# Patient Record
Sex: FEMALE | Race: WHITE | NOT HISPANIC OR LATINO
[De-identification: names, ages, dates, MRNs, and addresses within clinical notes are randomized per-mention and may not be internally consistent; named-entity substitution may affect disease eponyms.]

---

## 2021-03-02 ENCOUNTER — NON-APPOINTMENT (OUTPATIENT)
Age: 76
End: 2021-03-02

## 2021-03-02 ENCOUNTER — APPOINTMENT (OUTPATIENT)
Dept: OPHTHALMOLOGY | Facility: CLINIC | Age: 76
End: 2021-03-02

## 2021-05-25 ENCOUNTER — NON-APPOINTMENT (OUTPATIENT)
Age: 76
End: 2021-05-25

## 2021-05-25 ENCOUNTER — APPOINTMENT (OUTPATIENT)
Dept: OPHTHALMOLOGY | Facility: CLINIC | Age: 76
End: 2021-05-25

## 2021-07-20 ENCOUNTER — NON-APPOINTMENT (OUTPATIENT)
Age: 76
End: 2021-07-20

## 2021-07-21 ENCOUNTER — APPOINTMENT (OUTPATIENT)
Dept: OPHTHALMOLOGY | Facility: CLINIC | Age: 76
End: 2021-07-21

## 2022-10-03 ENCOUNTER — EMERGENCY (EMERGENCY)
Facility: HOSPITAL | Age: 77
LOS: 1 days | Discharge: HOME | End: 2022-10-03
Admitting: EMERGENCY MEDICINE
Payer: MEDICARE

## 2022-10-03 VITALS
SYSTOLIC BLOOD PRESSURE: 177 MMHG | OXYGEN SATURATION: 98 % | WEIGHT: 145.06 LBS | HEART RATE: 69 BPM | RESPIRATION RATE: 18 BRPM | TEMPERATURE: 98 F | HEIGHT: 63 IN | DIASTOLIC BLOOD PRESSURE: 85 MMHG

## 2022-10-03 VITALS
RESPIRATION RATE: 16 BRPM | SYSTOLIC BLOOD PRESSURE: 155 MMHG | DIASTOLIC BLOOD PRESSURE: 82 MMHG | OXYGEN SATURATION: 99 % | HEART RATE: 60 BPM

## 2022-10-03 PROCEDURE — 99283 EMERGENCY DEPT VISIT LOW MDM: CPT | Mod: 25

## 2022-10-03 PROCEDURE — 73562 X-RAY EXAM OF KNEE 3: CPT

## 2022-10-03 PROCEDURE — 73562 X-RAY EXAM OF KNEE 3: CPT | Mod: 26

## 2022-10-03 PROCEDURE — 73562 X-RAY EXAM OF KNEE 3: CPT | Mod: 26,RT

## 2022-10-03 NOTE — ED PROVIDER NOTE - CARE PROVIDER_API CALL
Anthony Cortez)  Orthopaedic Surgery  [address] [place] [address]  Phone: [phone]  Fax: [phone]  Follow Up Time:

## 2022-10-03 NOTE — ED PROVIDER NOTE - NSFOLLOWUPINSTRUCTIONS_ED_ALL_ED_FT
Acute Knee Pain, Adult      Many things can cause knee pain. Sometimes, knee pain is sudden (acute) and may be caused by damage, swelling, or irritation of the muscles and tissues that support your knee.    The pain often goes away on its own with time and rest. If the pain does not go away, tests may be done to find out what is causing the pain.      Follow these instructions at home:      If you have a knee sleeve or brace:      •Wear the knee sleeve or brace as told by your doctor. Take it off only as told by your doctor.    •Loosen it if your toes:  •Tingle.      •Become numb.      •Turn cold and blue.        •Keep it clean.    •If the knee sleeve or brace is not waterproof:  •Do not let it get wet.      •Cover it with a watertight covering when you take a bath or shower.        Activity     •Rest your knee.      • Do not do things that cause pain or make pain worse.      •Avoid activities where both feet leave the ground at the same time (high-impact activities). Examples are running, jumping rope, and doing jumping jacks.      •Work with a physical therapist to make a safe exercise program, as told by your doctor.        Managing pain, stiffness, and swelling    •If told, put ice on the knee. To do this:  •If you have a removable knee sleeve or brace, take it off as told by your doctor.      •Put ice in a plastic bag.      •Place a towel between your skin and the bag.      •Leave the ice on for 20 minutes, 2–3 times a day.      •Take off the ice if your skin turns bright red. This is very important. If you cannot feel pain, heat, or cold, you have a greater risk of damage to the area.        •If told, use an elastic bandage to put pressure (compression) on your injured knee.      •Raise your knee above the level of your heart while you are sitting or lying down.      •Sleep with a pillow under your knee.      General instructions     •Take over-the-counter and prescription medicines only as told by your doctor.      • Do not smoke or use any products that contain nicotine or tobacco. If you need help quitting, ask your doctor.      •If you are overweight, work with your doctor and a food expert (dietitian) to set goals to lose weight. Being overweight can make your knee hurt more.      •Watch for any changes in your symptoms.      •Keep all follow-up visits.        Contact a doctor if:    •The knee pain does not stop.      •The knee pain changes or gets worse.      •You have a fever along with knee pain.      •Your knee is red or feels warm when you touch it.      •Your knee gives out or locks up.        Get help right away if:    •Your knee swells, and the swelling gets worse.      •You cannot move your knee.      •You have very bad knee pain that does not get better with pain medicine.        Summary    •Many things can cause knee pain. The pain often goes away on its own with time and rest.      •Your doctor may do tests to find out the cause of the pain.      •Watch for any changes in your symptoms. Relieve your pain with rest, medicines, light activity, and use of ice.      •Get help right away if you cannot move your knee or your knee pain is very bad.      This information is not intended to replace advice given to you by your health care provider. Make sure you discuss any questions you have with your health care provider.

## 2022-10-03 NOTE — ED ADULT NURSE NOTE - OBJECTIVE STATEMENT
Pt presents to ED C/O R knee pain and swelling with painful ambulation. Pt reports symptoms coming on suddenly. Ambulates independently at baseline. Walking with cane upon arrival to [facility]. Denies other symptoms/ pain. Denies recent injury. Seen and eval by PA, knee wrapped with ACE by PA.

## 2022-10-03 NOTE — ED PROVIDER NOTE - PATIENT PORTAL LINK FT
You can access the FollowMyHealth Patient Portal offered by [facility] by registering at the following website: [email or web address]. By joining [employer]’s FollowMyHealth portal, you will also be able to view your health information using other applications (apps) compatible with our system.

## 2022-10-03 NOTE — ED PROVIDER NOTE - MUSCULOSKELETAL, MLM
right knee no swelling, no deformity, no bony tenderness, +FROM, strength 5/5, sensation intact distally, DP/PT 2+

## 2022-10-03 NOTE — ED ADULT TRIAGE NOTE - OTHER COMPLAINTS
patient c/o L knee pain and swelling x Saturday, states "it gave out on Saturday"-- arrives ambulatory with cane--denies fall/head strike

## 2022-10-03 NOTE — ED PROVIDER NOTE - CLINICAL SUMMARY MEDICAL DECISION MAKING FREE TEXT BOX
76 y/o f presents c/o atraumatic right knee pain x 1 day; ext NVI, pt ambulatory with a cane, declined pain meds.  XR shows hardware from previous surgery, no acute findings.  ACE wrap given, recommend elevate, tylenol PRN pain, f/u ortho

## 2022-10-03 NOTE — ED PROVIDER NOTE - OBJECTIVE STATEMENT
76 y/o f presents c/o knee pain which started yesterday as she stood up from a sitting position.  Pt reports pain to right knee, felt as though it "gave out" yesterday.  Pt hasn't been taking anything for pain, ambulating using a cane she borrowed.  Denies numbness/tingling to ext, weakness, all other ROS negative.

## 2022-10-06 ENCOUNTER — NON-APPOINTMENT (OUTPATIENT)
Age: 77
End: 2022-10-06

## 2022-10-06 DIAGNOSIS — M25.561 PAIN IN RIGHT KNEE: ICD-10-CM
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
## 2022-10-06 DIAGNOSIS — Z88.8 ALLERGY STATUS TO OTHER DRUGS, MEDICAMENTS AND BIOLOGICAL SUBSTANCES: ICD-10-CM

## 2022-10-06 DIAGNOSIS — Z91.018 ALLERGY TO OTHER FOODS: ICD-10-CM

## 2022-10-06 DIAGNOSIS — Z88.0 ALLERGY STATUS TO PENICILLIN: ICD-10-CM
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
## 2022-10-06 DIAGNOSIS — Z88.1 ALLERGY STATUS TO OTHER ANTIBIOTIC AGENTS STATUS: ICD-10-CM
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
## 2022-10-10 ENCOUNTER — APPOINTMENT (OUTPATIENT)
Dept: ORTHOPEDIC SURGERY | Facility: CLINIC | Age: 77
End: 2022-10-10

## 2022-10-17 ENCOUNTER — TRANSCRIPTION ENCOUNTER (OUTPATIENT)
Age: 77
End: 2022-10-17

## 2022-10-21 ENCOUNTER — APPOINTMENT (OUTPATIENT)
Dept: ORTHOPEDIC SURGERY | Facility: CLINIC | Age: 77
End: 2022-10-21

## 2022-10-21 VITALS — DIASTOLIC BLOOD PRESSURE: 76 MMHG | HEART RATE: 66 BPM | OXYGEN SATURATION: 99 % | SYSTOLIC BLOOD PRESSURE: 138 MMHG

## 2022-10-21 VITALS — BODY MASS INDEX: 25.69 KG/M2 | HEIGHT: 63 IN | WEIGHT: 145 LBS

## 2022-10-21 DIAGNOSIS — Z87.39 PERSONAL HISTORY OF OTHER DISEASES OF THE MUSCULOSKELETAL SYSTEM AND CONNECTIVE TISSUE: ICD-10-CM

## 2022-10-21 DIAGNOSIS — Z86.79 PERSONAL HISTORY OF OTHER DISEASES OF THE CIRCULATORY SYSTEM: ICD-10-CM

## 2022-10-21 PROCEDURE — 99203 OFFICE O/P NEW LOW 30 MIN: CPT

## 2022-10-23 PROBLEM — Z86.79 HISTORY OF HYPERTENSION: Status: RESOLVED | Noted: 2022-10-21 | Resolved: 2022-10-23

## 2022-10-23 PROBLEM — Z87.39 HISTORY OF ARTHRITIS: Status: RESOLVED | Noted: 2022-10-21 | Resolved: 2022-10-23

## 2022-10-23 RX ORDER — DENOSUMAB 60 MG/ML
60 INJECTION SUBCUTANEOUS
Qty: 1 | Refills: 0 | Status: ACTIVE | COMMUNITY
Start: 2022-06-20

## 2022-10-23 NOTE — HISTORY OF PRESENT ILLNESS
[0] : a current pain level of 0/10 [Intermit.] : ~He/She~ states the symptoms seem to be intermittent [None] : No relieving factors are noted [de-identified] : 10/21/22: 75 y/o female presents for right knee pain for about 1 month. Patient states she got up from the couch walked to the bathroom and felt her right knee give out. She denies any fall or precipitating events. She went to the ER and was provided ACE compression. She admits being opposed to pain medication and has not used anything for pain relief. Symptoms have spontaneously recovered since then. Describes her pain as a persisting dull sensations and rates it 0/10. \par \par PSH: Right patella fracture repair 25 years ago. She reports no residual pain following surgery. [de-identified] : patient states pain is dull.

## 2022-10-23 NOTE — DISCUSSION/SUMMARY
[de-identified] : 78 y/o female with essentially resolved right knee complaints secondary to what I most likely think to be a resolved pseudo-gout flare. \par - At this time she does not require any invasive treatment and she is not interested in any oral medications. \par - I wrote her for PT and home exercise program. \par - She will f/u for any recurrence of symptoms, especially if there is any associated swelling, to perform a joint aspiration.

## 2022-10-23 NOTE — ADDENDUM
[FreeTextEntry1] : Documented by Jessica Whitman acting as a scribe for Dr. Abhishek Lynn on 10/21/2022.

## 2022-10-23 NOTE — END OF VISIT
[FreeTextEntry3] : All medical record entries made by the Scribe were at my, Dr. Abhishek Lynn, direction and personally dictated by me on 10/21/2022. I have reviewed the chart and agree that the record accurately reflects my personal performance of the history, physical exam, assessment and plan. I have also personally directed, reviewed, and agreed with the chart.

## 2022-10-23 NOTE — PHYSICAL EXAM
[de-identified] : General appearance: well nourished and hydrated, pleasant, alert and oriented x 3, cooperative.  \par HEENT: normocephalic, EOM intact, wearing mask, external auditory canal clear.  \par Cardiovascular: no lower leg edema, scattered varicosities across bilateral lower extremities, dorsalis pedis pulses palpable and symmetric.  \par Lymphatics: no palpable lymphadenopathy, no lymphedema.  \par Neurologic: sensation is normal, no muscle weakness in upper or lower extremities, patella tendon reflexes present and symmetric.  \par Dermatologic: skin moist, warm, no rash.  \par Spine: cervical spine with normal lordosis and painless range of motion, thoracic spine with normal kyphosis and painless range of motion, lumbosacral spine with normal lordosis and painless range of motion.  No tenderness to palpation along midline spine and paraspinal musculature.  Sacroiliac joints nontender bilaterally. Negative SLR and crossed SLR tests bilaterally.\par Gait: she is ambulating without an assistive device and without antalgia\par \par Left knee: \par - Focal soft tissue swelling: none\par - Ecchymosis: none\par - Erythema: none\par - Effusion: none\par - Wounds: none\par - Alignment: normal\par - Tenderness: none\par - ROM: 0-135\par - Collateral laxity: none\par - Cruciate laxity: none\par - Meniscal signs: negative Vladislav and Lakisha\par - Popliteal angle (degrees): 30\par - Quad strength: 5/5\par \par Right knee:\par - Focal soft tissue swelling: none\par - Ecchymosis: none\par - Erythema: none\par - Effusion: none\par - Wounds: well healed benign appearing anterior incision\par - Alignment: normal\par - Tenderness: none\par - ROM: 3-130\par - Collateral laxity: none\par - Cruciate laxity: none\par - Meniscal signs: significant prepatellar crepitus with Vladislav and Lakisha but no pain or clicking\par - Popliteal angle (degrees): 35\par - Quad strength: 5/5\par  [de-identified] : Imaging reviewed today is AP, lateral, and Merchant views taken 10/3/2022 of the right knee at [facility]. These demonstrate evidence of prior patella fracture with screw fixation with 3 retained screws in the patella. No residual fracture line is visible, nor any malunion of the patella. There is evidence of mild tricompartmental OA, KL 2. There is also medial and lateral compartment calcinosis. Patella sits at normal height and tracks centrally.

## 2022-11-22 ENCOUNTER — NON-APPOINTMENT (OUTPATIENT)
Age: 77
End: 2022-11-22

## 2022-11-22 ENCOUNTER — APPOINTMENT (OUTPATIENT)
Dept: OPHTHALMOLOGY | Facility: CLINIC | Age: 77
End: 2022-11-22

## 2023-06-14 ENCOUNTER — APPOINTMENT (OUTPATIENT)
Dept: INTERNAL MEDICINE | Facility: CLINIC | Age: 78
End: 2023-06-14

## 2023-06-15 ENCOUNTER — OUTPATIENT (OUTPATIENT)
Dept: OUTPATIENT SERVICES | Facility: HOSPITAL | Age: 78
LOS: 1 days | End: 2023-06-15
Payer: MEDICARE

## 2023-06-15 ENCOUNTER — APPOINTMENT (OUTPATIENT)
Dept: ULTRASOUND IMAGING | Facility: HOSPITAL | Age: 78
End: 2023-06-15

## 2023-06-15 PROCEDURE — 93880 EXTRACRANIAL BILAT STUDY: CPT

## 2023-06-15 PROCEDURE — 93880 EXTRACRANIAL BILAT STUDY: CPT | Mod: 26

## 2023-07-26 ENCOUNTER — APPOINTMENT (OUTPATIENT)
Dept: OPHTHALMOLOGY | Facility: CLINIC | Age: 78
End: 2023-07-26

## 2023-07-26 ENCOUNTER — APPOINTMENT (OUTPATIENT)
Dept: NEUROLOGY | Facility: CLINIC | Age: 78
End: 2023-07-26

## 2023-08-11 ENCOUNTER — APPOINTMENT (OUTPATIENT)
Dept: ORTHOPEDIC SURGERY | Facility: CLINIC | Age: 78
End: 2023-08-11

## 2023-08-25 ENCOUNTER — APPOINTMENT (OUTPATIENT)
Dept: NEUROLOGY | Facility: CLINIC | Age: 78
End: 2023-08-25
Payer: MEDICARE

## 2023-08-25 VITALS
BODY MASS INDEX: 25.69 KG/M2 | DIASTOLIC BLOOD PRESSURE: 68 MMHG | HEIGHT: 63 IN | HEART RATE: 98 BPM | TEMPERATURE: 97.6 F | WEIGHT: 145 LBS | RESPIRATION RATE: 17 BRPM | OXYGEN SATURATION: 97 % | SYSTOLIC BLOOD PRESSURE: 130 MMHG

## 2023-08-25 DIAGNOSIS — R55 SYNCOPE AND COLLAPSE: ICD-10-CM

## 2023-08-25 PROCEDURE — 99204 OFFICE O/P NEW MOD 45 MIN: CPT

## 2023-08-25 NOTE — CONSULT LETTER
[Dear  ___] : Dear  [unfilled], [Courtesy Letter:] : I had the pleasure of seeing your patient, [unfilled], in my office today. [Please see my note below.] : Please see my note below. [Consult Closing:] : Thank you very much for allowing me to participate in the care of this patient.  If you have any questions, please do not hesitate to contact me. [Sincerely,] : Sincerely, [DrPerla  ___] : Dr. COVINGTON [FreeTextEntry3] : Jorge A Abraham MD

## 2023-08-25 NOTE — PHYSICAL EXAM
[FreeTextEntry1] : General: this is a pleasant patient in no acute distress  HEENT conjunctiva are normal, no tenderness in head  CV: normal pulses, regular rate and rhythm, no peripheral edema noted  Lungs: breathing is non-labored  abd: soft and non-distended  MSK: arthritic joints noted SLR:  RUTH: range of motion: mild restricted tinnels:  spurling: Occipital nerve tenderness:  Mental status: Alert and oriented to person, place and time, normal speech and comprehension  Cranial Nerves: extra-occular movements in tact without nystagmus, normal saccades and smooth pursuit, Face symmetric and facial strength symmetric, facial sensation symmetric, left hearing to rub, right to SNAP  Motor: normal bulk and tone throughout. no abnormal movements.  Full 5/5 strength uppers and lower extremities proximally and distally  Sensory: in tact and symmetric to vibration, light tough, temperature  Cerebellar: normal finger-nose-finger bilaterally  Reflexes: 2+ in the upper and lower extremities and symmetric.  toes are bilaterally downgoing.  Gait: stable, able to tip toe heel and mild difficulty tandem  Stances: Romberg: normal

## 2023-08-25 NOTE — HISTORY OF PRESENT ILLNESS
[FreeTextEntry1] : VALERIANO CONNORS is a 78 year who presents with visual phenomenon  since childhood, episodes of visual obscuration making her need to stop and wait for it to finish. typically develops over 5 minutes to the point of needing to rest. then it typically resolves within 30 minutes.   last year had blacking out of the eyes that passed on its own. optho said it was migraine aura.  started throughout whole vision total blackness and she sat down and closed her eyes.  she felt upset and a bit sick. she was able to open eyes and see within 15 minutes. there was really no positive visual phenomena.  this only happened twice and hasn't recurrent.  doesn't really remember what was going on those days but doesn't remember anything being unusual.   was present for the events and told her to sit down and calm down.  she didn't lie down for either.   2.5 years ago her  told her that she fainted after a hot shower. she was unaware this had happened.   Denies diplopia, blurred vision, dysphagia, dysarthria, aphasia, focal weakness or numbness, bowel or bladder dysfunction, falls, headaches. chronically a bit clumsy but no major change.  no change in sense of smell, mood, personality over the years.    she had MRI at [facility] radiology

## 2023-10-25 ENCOUNTER — APPOINTMENT (OUTPATIENT)
Dept: OPHTHALMOLOGY | Facility: CLINIC | Age: 78
End: 2023-10-25

## 2023-10-25 ENCOUNTER — NON-APPOINTMENT (OUTPATIENT)
Age: 78
End: 2023-10-25

## 2023-10-27 ENCOUNTER — NON-APPOINTMENT (OUTPATIENT)
Age: 78
End: 2023-10-27

## 2023-11-14 ENCOUNTER — APPOINTMENT (OUTPATIENT)
Dept: INTERNAL MEDICINE | Facility: CLINIC | Age: 78
End: 2023-11-14
Payer: MEDICARE

## 2023-11-14 VITALS
SYSTOLIC BLOOD PRESSURE: 133 MMHG | BODY MASS INDEX: 25.41 KG/M2 | OXYGEN SATURATION: 98 % | WEIGHT: 143.38 LBS | TEMPERATURE: 98.2 F | DIASTOLIC BLOOD PRESSURE: 69 MMHG | HEART RATE: 68 BPM | HEIGHT: 63 IN

## 2023-11-14 PROCEDURE — 99214 OFFICE O/P EST MOD 30 MIN: CPT

## 2023-11-14 PROCEDURE — G0439: CPT

## 2023-11-14 RX ORDER — ADHESIVE TAPE 3"X 2.3 YD
50 MCG TAPE, NON-MEDICATED TOPICAL
Refills: 0 | Status: ACTIVE | COMMUNITY

## 2023-11-14 RX ORDER — DENOSUMAB 60 MG/ML
60 INJECTION SUBCUTANEOUS
Refills: 0 | Status: ACTIVE | COMMUNITY

## 2023-11-15 ENCOUNTER — APPOINTMENT (OUTPATIENT)
Dept: INTERNAL MEDICINE | Facility: CLINIC | Age: 78
End: 2023-11-15
Payer: MEDICARE

## 2023-11-15 PROCEDURE — 36415 COLL VENOUS BLD VENIPUNCTURE: CPT

## 2023-11-16 LAB
25(OH)D3 SERPL-MCNC: 42.9 NG/ML
ALBUMIN SERPL ELPH-MCNC: 4.5 G/DL
ALP BLD-CCNC: 68 U/L
ALT SERPL-CCNC: 14 U/L
ANION GAP SERPL CALC-SCNC: 13 MMOL/L
AST SERPL-CCNC: 20 U/L
BILIRUB SERPL-MCNC: 0.6 MG/DL
BUN SERPL-MCNC: 21 MG/DL
CALCIUM SERPL-MCNC: 9.7 MG/DL
CHLORIDE SERPL-SCNC: 104 MMOL/L
CHOLEST SERPL-MCNC: 185 MG/DL
CO2 SERPL-SCNC: 28 MMOL/L
CREAT SERPL-MCNC: 1.1 MG/DL
EGFR: 52 ML/MIN/1.73M2
ESTIMATED AVERAGE GLUCOSE: 114 MG/DL
GLUCOSE SERPL-MCNC: 83 MG/DL
HBA1C MFR BLD HPLC: 5.6 %
HCT VFR BLD CALC: 41.7 %
HDLC SERPL-MCNC: 66 MG/DL
HGB BLD-MCNC: 13.4 G/DL
LDLC SERPL CALC-MCNC: 92 MG/DL
MCHC RBC-ENTMCNC: 31.8 PG
MCHC RBC-ENTMCNC: 32.1 GM/DL
MCV RBC AUTO: 99 FL
NONHDLC SERPL-MCNC: 119 MG/DL
PLATELET # BLD AUTO: 176 K/UL
POTASSIUM SERPL-SCNC: 4.1 MMOL/L
PROT SERPL-MCNC: 6.9 G/DL
RBC # BLD: 4.21 M/UL
RBC # FLD: 13.2 %
SODIUM SERPL-SCNC: 144 MMOL/L
T4 FREE SERPL-MCNC: 1.4 NG/DL
TRIGL SERPL-MCNC: 163 MG/DL
TSH SERPL-ACNC: 2.02 UIU/ML
WBC # FLD AUTO: 5.61 K/UL

## 2023-11-27 ENCOUNTER — APPOINTMENT (OUTPATIENT)
Dept: INTERNAL MEDICINE | Facility: CLINIC | Age: 78
End: 2023-11-27
Payer: MEDICARE

## 2023-11-27 VITALS
SYSTOLIC BLOOD PRESSURE: 134 MMHG | BODY MASS INDEX: 25.34 KG/M2 | TEMPERATURE: 98.1 F | WEIGHT: 143 LBS | HEART RATE: 54 BPM | DIASTOLIC BLOOD PRESSURE: 82 MMHG | HEIGHT: 63 IN | OXYGEN SATURATION: 99 %

## 2023-11-27 DIAGNOSIS — M11.261 OTHER CHONDROCALCINOSIS, RIGHT KNEE: ICD-10-CM

## 2023-11-27 DIAGNOSIS — G43.109 MIGRAINE WITH AURA, NOT INTRACTABLE, W/OUT STATUS MIGRAINOSUS: ICD-10-CM

## 2023-11-27 PROCEDURE — 99213 OFFICE O/P EST LOW 20 MIN: CPT

## 2023-12-11 ENCOUNTER — APPOINTMENT (OUTPATIENT)
Dept: HEART AND VASCULAR | Facility: CLINIC | Age: 78
End: 2023-12-11
Payer: MEDICARE

## 2023-12-11 ENCOUNTER — NON-APPOINTMENT (OUTPATIENT)
Age: 78
End: 2023-12-11

## 2023-12-11 VITALS
HEART RATE: 56 BPM | OXYGEN SATURATION: 97 % | DIASTOLIC BLOOD PRESSURE: 67 MMHG | BODY MASS INDEX: 25.69 KG/M2 | SYSTOLIC BLOOD PRESSURE: 120 MMHG | WEIGHT: 145 LBS | HEIGHT: 63 IN

## 2023-12-11 PROCEDURE — 93000 ELECTROCARDIOGRAM COMPLETE: CPT

## 2023-12-11 PROCEDURE — 99204 OFFICE O/P NEW MOD 45 MIN: CPT | Mod: 25

## 2023-12-11 RX ORDER — ASCORBIC ACID 125 MG
TABLET,CHEWABLE ORAL
Refills: 0 | Status: ACTIVE | COMMUNITY

## 2023-12-11 RX ORDER — ROSUVASTATIN CALCIUM 10 MG/1
10 TABLET, FILM COATED ORAL
Qty: 90 | Refills: 3 | Status: ACTIVE | COMMUNITY
Start: 2023-12-11 | End: 1900-01-01

## 2023-12-14 NOTE — ASSESSMENT
[FreeTextEntry1] : 78 year old woman h/o HTN, CKD, HLD, arthritis, right knee fracture s/p surgical repair, migraine with aura presents to establish care with lightheadedness and remote episode of syncope, which is likely vasovagal however no prior work-up, sinus lizabeth with rate variation, will obtain event monitor.  - ASCVD 10 year risk is intermediate 17.9%. Discussed benefits and side effects of statin, patient would like to start.  - Start Crestor 10 mg daily - TTE and 7 day event monitor - BP at goal, continue atenolol qd, amlodipine, and losartan-HCTZ for now

## 2023-12-14 NOTE — PHYSICAL EXAM
[No Acute Distress] : no acute distress [Normal Venous Pressure] : normal venous pressure [No Carotid Bruit] : no carotid bruit [Normal S1, S2] : normal S1, S2 [No Murmur] : no murmur [No Rub] : no rub [No Gallop] : no gallop [Soft] : abdomen soft [Non Tender] : non-tender [No Edema] : no edema

## 2023-12-14 NOTE — REASON FOR VISIT
[FreeTextEntry1] : 78 year old woman h/o HTN, CKD, HLD, arthritis, right knee fracture s/p surgical repair, migraine with aura presents to establish care. She does not do exercises due to knee pain. Was on NSAIDS however was instructed not take by PCP due to CKD. Denies chest pain or dyspnea with exertion. Walks [facility] [address] to [place], no symptoms.  Three ago, had syncopal episode after coming out of hot shower, then found herself on floor by . A few minutes of LOC patient believes. No prodrome, no vision changes before. No incontinence, no tongue biting. Was seen by PCP prior. Since as teenager, occasionally has had episodes of visual aura, blurriness and darkness at periphery of vision and one episode of "black out" vision with no loss of consciousness. No diaphoresis, no dyspnea or chest pain, no lightheadedness associated. No clear triggers.   Saw Cardiologist at [facility] however would like to switch care. Was previously only taking atenolol 50 BID for HTN, then losartan-HCTZ 50-12.5 and amlodipine 5 mg was added 1 month ago.   SH Former smoker, 1 pack year quit 56 years ago No EtOH Has 3 children, C-sections, uncomplicated  disabled  FH Sister  71 years old MI and CVA Brother MI at mid to late 50s

## 2023-12-15 ENCOUNTER — APPOINTMENT (OUTPATIENT)
Dept: OPHTHALMOLOGY | Facility: CLINIC | Age: 78
End: 2023-12-15

## 2023-12-20 LAB
APPEARANCE: CLEAR
BACTERIA: NEGATIVE /HPF
BILIRUBIN URINE: NEGATIVE
BLOOD URINE: NEGATIVE
CAST: 3 /LPF
COLOR: YELLOW
EPITHELIAL CELLS: 2 /HPF
GLUCOSE QUALITATIVE U: NEGATIVE MG/DL
KETONES URINE: NEGATIVE MG/DL
LEUKOCYTE ESTERASE URINE: ABNORMAL
MICROSCOPIC-UA: NORMAL
NITRITE URINE: NEGATIVE
PH URINE: 7.5
PROTEIN URINE: NORMAL MG/DL
RED BLOOD CELLS URINE: 3 /HPF
SPECIFIC GRAVITY URINE: 1.02
UROBILINOGEN URINE: 0.2 MG/DL
WHITE BLOOD CELLS URINE: 3 /HPF

## 2024-01-03 ENCOUNTER — APPOINTMENT (OUTPATIENT)
Dept: DERMATOLOGY | Facility: CLINIC | Age: 79
End: 2024-01-03
Payer: MEDICARE

## 2024-01-03 DIAGNOSIS — L30.9 DERMATITIS, UNSPECIFIED: ICD-10-CM

## 2024-01-03 DIAGNOSIS — D48.9 NEOPLASM OF UNCERTAIN BEHAVIOR, UNSPECIFIED: ICD-10-CM

## 2024-01-03 DIAGNOSIS — Z87.891 PERSONAL HISTORY OF NICOTINE DEPENDENCE: ICD-10-CM

## 2024-01-03 DIAGNOSIS — D18.01 HEMANGIOMA OF SKIN AND SUBCUTANEOUS TISSUE: ICD-10-CM

## 2024-01-03 DIAGNOSIS — Z12.83 ENCOUNTER FOR SCREENING FOR MALIGNANT NEOPLASM OF SKIN: ICD-10-CM

## 2024-01-03 DIAGNOSIS — B35.3 TINEA PEDIS: ICD-10-CM

## 2024-01-03 PROCEDURE — 11102 TANGNTL BX SKIN SINGLE LES: CPT

## 2024-01-03 PROCEDURE — 17000 DESTRUCT PREMALG LESION: CPT | Mod: 59

## 2024-01-03 PROCEDURE — 99204 OFFICE O/P NEW MOD 45 MIN: CPT | Mod: 25

## 2024-01-03 RX ORDER — KETOCONAZOLE 20 MG/G
2 CREAM TOPICAL
Qty: 60 | Refills: 3 | Status: ACTIVE | COMMUNITY
Start: 2024-01-03 | End: 1900-01-01

## 2024-01-03 RX ORDER — MAGNESIUM OXIDE 400 MG/1
400 TABLET ORAL
Refills: 0 | Status: ACTIVE | COMMUNITY

## 2024-01-03 NOTE — HISTORY OF PRESENT ILLNESS
[FreeTextEntry1] : NPV- skin check  [de-identified] : Suly Harrison is a 79 y/o F patient presenting to the clinic for a full body check. No areas of concern.  Patient reports always having dry and sensitive skin. Skin itches and uses Eucerin cream, neutrogena Norwegian hand cream, and aquaphor at night that helps with the itching. She describes herself as "OCD" and frequently washes her hands during the day. Used to golf. Is sun protective now.

## 2024-01-03 NOTE — ASSESSMENT
[FreeTextEntry1] : #Skin cancer screening  Sun protection reviewed. The patient was educated regarding appropriate sun protection measures, including wearing sunscreen with SPF 30 or higher when outdoors, sun protective clothing, and sun avoidance.  # Cherry angioma # Lentigines Patient was reassured of the benign nature of these findings. No further treatment needed at this time. If any lesion changes or becomes symptomatic I recommend follow-up  #Neoplasm of Uncertain Behavior Location: left shin Differential Diagnosis: sccis, dermatitis  Recommendation: skin biopsy by shave technique   Clinical photographs were recommended in order to document the appearance and location of skin lesion/s.  Verbal consent obtained from the patient to proceed with photography. These photos will remain in the patient's electronic health record.   Biopsy by Shave Technique- Procedure Note Verbal consent was obtained and a time out was performed. The patient was counseled about the risk of bleeding, scar, and infection. The area was cleaned with an alcohol swab. Anesthesia: 1% lidocaine with 1:100,000 epinephrine buffered with sodium bicarbonate Procedure: Biopsy by shave technique The specimen was sent for pathologic examination. Hemostasis: aluminum chloride A bandage was placed and wound care was reviewed with the patient.  #Tinea Pedis - Start Ketoconazole 2% cream BID for 2-4 weeks - Patient instructed to contact me for return visit if not improved after one month  #Actinic keratosis   Provided anticipatory guidance and education regarding these lesions.  Given there is a risk of malignancy without treatment, I would recommend treatment with cryotherapy.   Cryotherapy Procedure Note.  Lesion(s) treated: 1 , Location: glabella After obtaining verbal consent, including counseling on risks of dyspigmentation and scarring, liquid nitrogen was applied to the above lesions. The patient tolerated the procedure well.  Wound care was reviewed.

## 2024-01-03 NOTE — PHYSICAL EXAM
[Alert] : alert [Oriented x 3] : ~L oriented x 3 [Well Nourished] : well nourished [Conjunctiva Non-injected] : conjunctiva non-injected [No Visual Lymphadenopathy] : no visual  lymphadenopathy [No Clubbing] : no clubbing [No Edema] : no edema [No Bromhidrosis] : no bromhidrosis [No Chromhidrosis] : no chromhidrosis [Full Body Skin Exam Performed] : performed [FreeTextEntry3] : Genital exam deferred  -glabella: pink scaly non indurated macule -On the left shin is a well demarcated erythematous scaly superficial plaque. -Right 4-5th toe webspace- macerated scaley erosions. - Scattered tan smooth macules with regular borders and pigmentation. Many of these were examined with dermoscopy and had benign features. -  Scattered on the trunk and extremities are several cherry red papules.

## 2024-01-09 ENCOUNTER — APPOINTMENT (OUTPATIENT)
Dept: OPHTHALMOLOGY | Facility: CLINIC | Age: 79
End: 2024-01-09

## 2024-01-16 ENCOUNTER — NON-APPOINTMENT (OUTPATIENT)
Age: 79
End: 2024-01-16

## 2024-01-16 LAB — DERMATOLOGY BIOPSY: NORMAL

## 2024-01-17 ENCOUNTER — APPOINTMENT (OUTPATIENT)
Dept: DERMATOLOGY | Facility: CLINIC | Age: 79
End: 2024-01-17
Payer: MEDICARE

## 2024-01-17 DIAGNOSIS — L57.0 ACTINIC KERATOSIS: ICD-10-CM

## 2024-01-17 PROCEDURE — 17000 DESTRUCT PREMALG LESION: CPT | Mod: 79

## 2024-01-17 NOTE — HISTORY OF PRESENT ILLNESS
[FreeTextEntry1] : Follow up for cryosurgery [de-identified] : Suly Harrison is a 78 years old F follow up for planned cryosurgery to treat biopsied actinic keratosis.

## 2024-01-17 NOTE — ASSESSMENT
[FreeTextEntry1] :  #Lichenoid actinic keratosis , left shin Provided anticipatory guidance and education regarding these lesions.  Given there is a risk of malignancy without treatment, I would recommend treatment with cryotherapy.   Cryotherapy Procedure Note.  Lesion(s) treated: 1 , Location: left shin  After obtaining verbal consent, including counseling on risks of dyspigmentation and scarring, liquid nitrogen was applied to the above lesions x2 cycles. The patient tolerated the procedure well.  Wound care was reviewed.

## 2024-01-24 ENCOUNTER — APPOINTMENT (OUTPATIENT)
Dept: INTERNAL MEDICINE | Facility: CLINIC | Age: 79
End: 2024-01-24
Payer: MEDICARE

## 2024-01-24 VITALS
DIASTOLIC BLOOD PRESSURE: 69 MMHG | HEIGHT: 63 IN | OXYGEN SATURATION: 100 % | HEART RATE: 60 BPM | TEMPERATURE: 98 F | SYSTOLIC BLOOD PRESSURE: 116 MMHG

## 2024-01-24 DIAGNOSIS — M25.473 EFFUSION, UNSPECIFIED ANKLE: ICD-10-CM

## 2024-01-24 DIAGNOSIS — M25.471 EFFUSION, RIGHT ANKLE: ICD-10-CM

## 2024-01-24 DIAGNOSIS — M25.472 EFFUSION, RIGHT ANKLE: ICD-10-CM

## 2024-01-24 PROCEDURE — 99213 OFFICE O/P EST LOW 20 MIN: CPT

## 2024-01-24 NOTE — ASSESSMENT
[FreeTextEntry1] : Ankle swelling. Likely from the  Norvasc Will not change at this time  Also increaser magnesium

## 2024-01-24 NOTE — PHYSICAL EXAM
[No Acute Distress] : no acute distress [Well Nourished] : well nourished [Well Developed] : well developed [Well-Appearing] : well-appearing [Normal Sclera/Conjunctiva] : normal sclera/conjunctiva [PERRL] : pupils equal round and reactive to light [EOMI] : extraocular movements intact [Normal Outer Ear/Nose] : the outer ears and nose were normal in appearance [Normal Oropharynx] : the oropharynx was normal [No JVD] : no jugular venous distention [No Lymphadenopathy] : no lymphadenopathy [Supple] : supple [Thyroid Normal, No Nodules] : the thyroid was normal and there were no nodules present [No Respiratory Distress] : no respiratory distress  [No Accessory Muscle Use] : no accessory muscle use [Clear to Auscultation] : lungs were clear to auscultation bilaterally [Normal Rate] : normal rate  [Regular Rhythm] : with a regular rhythm [Normal S1, S2] : normal S1 and S2 [No Murmur] : no murmur heard [No Carotid Bruits] : no carotid bruits [No Abdominal Bruit] : a ~M bruit was not heard ~T in the abdomen [No Varicosities] : no varicosities [Pedal Pulses Present] : the pedal pulses are present [No Edema] : there was no peripheral edema [No Palpable Aorta] : no palpable aorta [No Extremity Clubbing/Cyanosis] : no extremity clubbing/cyanosis [Soft] : abdomen soft [Non Tender] : non-tender [Non-distended] : non-distended [No Masses] : no abdominal mass palpated [No HSM] : no HSM [Normal Bowel Sounds] : normal bowel sounds [Normal Posterior Cervical Nodes] : no posterior cervical lymphadenopathy [Normal Anterior Cervical Nodes] : no anterior cervical lymphadenopathy [No CVA Tenderness] : no CVA  tenderness [No Spinal Tenderness] : no spinal tenderness [Grossly Normal Strength/Tone] : grossly normal strength/tone [No Rash] : no rash [Coordination Grossly Intact] : coordination grossly intact [No Focal Deficits] : no focal deficits [Normal Gait] : normal gait [Deep Tendon Reflexes (DTR)] : deep tendon reflexes were 2+ and symmetric [Normal Affect] : the affect was normal [Normal Insight/Judgement] : insight and judgment were intact [de-identified] : Mild javier welling

## 2024-01-24 NOTE — HEALTH RISK ASSESSMENT
[No] : No [No falls in past year] : Patient reported no falls in the past year [0] : 2) Feeling down, depressed, or hopeless: Not at all (0) [[id number]] : 0

## 2024-01-24 NOTE — HISTORY OF PRESENT ILLNESS
[FreeTextEntry1] : Magnesium seems to have help Pain improved [de-identified] : Ankles swollen at times;  May be from Amlodipine

## 2024-01-26 ENCOUNTER — NON-APPOINTMENT (OUTPATIENT)
Age: 79
End: 2024-01-26

## 2024-01-26 ENCOUNTER — OUTPATIENT (OUTPATIENT)
Dept: OUTPATIENT SERVICES | Facility: HOSPITAL | Age: 79
LOS: 1 days | End: 2024-01-26
Payer: MEDICARE

## 2024-01-26 DIAGNOSIS — I10 ESSENTIAL (PRIMARY) HYPERTENSION: ICD-10-CM

## 2024-01-26 DIAGNOSIS — R55 SYNCOPE AND COLLAPSE: ICD-10-CM

## 2024-01-26 PROCEDURE — 93306 TTE W/DOPPLER COMPLETE: CPT | Mod: 26

## 2024-01-26 PROCEDURE — 93306 TTE W/DOPPLER COMPLETE: CPT

## 2024-01-29 RX ORDER — LOSARTAN POTASSIUM AND HYDROCHLOROTHIAZIDE 12.5; 1 MG/1; MG/1
100-12.5 TABLET ORAL DAILY
Qty: 90 | Refills: 0 | Status: ACTIVE | COMMUNITY
Start: 2024-01-29 | End: 1900-01-01

## 2024-01-29 RX ORDER — LOSARTAN POTASSIUM AND HYDROCHLOROTHIAZIDE 12.5; 5 MG/1; MG/1
50-12.5 TABLET ORAL DAILY
Qty: 90 | Refills: 3 | Status: DISCONTINUED | COMMUNITY
End: 2024-01-29

## 2024-02-04 ENCOUNTER — INPATIENT (INPATIENT)
Facility: HOSPITAL | Age: 79
LOS: 1 days | Discharge: HOME | DRG: 202 | End: 2024-02-06
Attending: INTERNAL MEDICINE | Admitting: INTERNAL MEDICINE
Payer: MEDICARE

## 2024-02-04 VITALS
RESPIRATION RATE: 17 BRPM | HEIGHT: 62 IN | HEART RATE: 56 BPM | SYSTOLIC BLOOD PRESSURE: 98 MMHG | DIASTOLIC BLOOD PRESSURE: 55 MMHG | OXYGEN SATURATION: 98 % | TEMPERATURE: 98 F | WEIGHT: 145.06 LBS

## 2024-02-04 DIAGNOSIS — N28.9 DISORDER OF KIDNEY AND URETER, UNSPECIFIED: ICD-10-CM

## 2024-02-04 DIAGNOSIS — R55 SYNCOPE AND COLLAPSE: ICD-10-CM

## 2024-02-04 DIAGNOSIS — J20.5 ACUTE BRONCHITIS DUE TO RESPIRATORY SYNCYTIAL VIRUS: ICD-10-CM

## 2024-02-04 DIAGNOSIS — E78.5 HYPERLIPIDEMIA, UNSPECIFIED: ICD-10-CM

## 2024-02-04 DIAGNOSIS — R91.8 OTHER NONSPECIFIC ABNORMAL FINDING OF LUNG FIELD: ICD-10-CM

## 2024-02-04 DIAGNOSIS — J98.11 ATELECTASIS: ICD-10-CM

## 2024-02-04 DIAGNOSIS — S41.111A LACERATION WITHOUT FOREIGN BODY OF RIGHT UPPER ARM, INITIAL ENCOUNTER: ICD-10-CM

## 2024-02-04 DIAGNOSIS — I10 ESSENTIAL (PRIMARY) HYPERTENSION: ICD-10-CM

## 2024-02-04 DIAGNOSIS — S41.112A LACERATION WITHOUT FOREIGN BODY OF LEFT UPPER ARM, INITIAL ENCOUNTER: ICD-10-CM

## 2024-02-04 DIAGNOSIS — M81.0 AGE-RELATED OSTEOPOROSIS WITHOUT CURRENT PATHOLOGICAL FRACTURE: ICD-10-CM
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
## 2024-02-04 DIAGNOSIS — R00.1 BRADYCARDIA, UNSPECIFIED: ICD-10-CM

## 2024-02-04 DIAGNOSIS — E86.0 DEHYDRATION: ICD-10-CM

## 2024-02-04 DIAGNOSIS — Z29.9 ENCOUNTER FOR PROPHYLACTIC MEASURES, UNSPECIFIED: ICD-10-CM

## 2024-02-04 DIAGNOSIS — N17.9 ACUTE KIDNEY FAILURE, UNSPECIFIED: ICD-10-CM

## 2024-02-04 DIAGNOSIS — B33.8 OTHER SPECIFIED VIRAL DISEASES: ICD-10-CM
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
## 2024-02-04 DIAGNOSIS — R26.81 UNSTEADINESS ON FEET: ICD-10-CM

## 2024-02-04 LAB
ALBUMIN SERPL ELPH-MCNC: 3.9 G/DL — SIGNIFICANT CHANGE UP (ref 3.3–5)
ALP SERPL-CCNC: 57 U/L — SIGNIFICANT CHANGE UP (ref 40–120)
ALT FLD-CCNC: 14 U/L — SIGNIFICANT CHANGE UP (ref 10–45)
ANION GAP SERPL CALC-SCNC: 15 MMOL/L — SIGNIFICANT CHANGE UP (ref 5–17)
APPEARANCE UR: CLEAR — SIGNIFICANT CHANGE UP
APTT BLD: 26.2 SEC — SIGNIFICANT CHANGE UP (ref 24.5–35.6)
AST SERPL-CCNC: 26 U/L — SIGNIFICANT CHANGE UP (ref 10–40)
BACTERIA # UR AUTO: NEGATIVE /HPF — SIGNIFICANT CHANGE UP
BASOPHILS # BLD AUTO: 0.06 K/UL — SIGNIFICANT CHANGE UP (ref 0–0.2)
BASOPHILS NFR BLD AUTO: 0.7 % — SIGNIFICANT CHANGE UP (ref 0–2)
BILIRUB SERPL-MCNC: 1.2 MG/DL — SIGNIFICANT CHANGE UP (ref 0.2–1.2)
BILIRUB UR-MCNC: NEGATIVE — SIGNIFICANT CHANGE UP
BUN SERPL-MCNC: 45 MG/DL — HIGH (ref 7–23)
CALCIUM SERPL-MCNC: 9.8 MG/DL — SIGNIFICANT CHANGE UP (ref 8.4–10.5)
CAST: 0 /LPF — SIGNIFICANT CHANGE UP (ref 0–4)
CHLORIDE SERPL-SCNC: 98 MMOL/L — SIGNIFICANT CHANGE UP (ref 96–108)
CK MB CFR SERPL CALC: 3.9 NG/ML — SIGNIFICANT CHANGE UP (ref 0–6.7)
CK SERPL-CCNC: 214 U/L — HIGH (ref 25–170)
CO2 SERPL-SCNC: 22 MMOL/L — SIGNIFICANT CHANGE UP (ref 22–31)
COLOR SPEC: YELLOW — SIGNIFICANT CHANGE UP
CREAT ?TM UR-MCNC: 47 MG/DL — SIGNIFICANT CHANGE UP
CREAT SERPL-MCNC: 2.66 MG/DL — HIGH (ref 0.5–1.3)
DIFF PNL FLD: NEGATIVE — SIGNIFICANT CHANGE UP
EGFR: 18 ML/MIN/1.73M2 — LOW
EOSINOPHIL # BLD AUTO: 0.05 K/UL — SIGNIFICANT CHANGE UP (ref 0–0.5)
EOSINOPHIL NFR BLD AUTO: 0.6 % — SIGNIFICANT CHANGE UP (ref 0–6)
GLUCOSE SERPL-MCNC: 152 MG/DL — HIGH (ref 70–99)
GLUCOSE UR QL: NEGATIVE MG/DL — SIGNIFICANT CHANGE UP
HCT VFR BLD CALC: 36.3 % — SIGNIFICANT CHANGE UP (ref 34.5–45)
HGB BLD-MCNC: 12.6 G/DL — SIGNIFICANT CHANGE UP (ref 11.5–15.5)
IMM GRANULOCYTES NFR BLD AUTO: 0.4 % — SIGNIFICANT CHANGE UP (ref 0–0.9)
INR BLD: 1.05 — SIGNIFICANT CHANGE UP (ref 0.85–1.18)
KETONES UR-MCNC: ABNORMAL MG/DL
LEGIONELLA AG UR QL: NEGATIVE — SIGNIFICANT CHANGE UP
LEUKOCYTE ESTERASE UR-ACNC: ABNORMAL
LYMPHOCYTES # BLD AUTO: 1.28 K/UL — SIGNIFICANT CHANGE UP (ref 1–3.3)
LYMPHOCYTES # BLD AUTO: 14.2 % — SIGNIFICANT CHANGE UP (ref 13–44)
MAGNESIUM SERPL-MCNC: 2.2 MG/DL — SIGNIFICANT CHANGE UP (ref 1.6–2.6)
MCHC RBC-ENTMCNC: 31.7 PG — SIGNIFICANT CHANGE UP (ref 27–34)
MCHC RBC-ENTMCNC: 34.7 GM/DL — SIGNIFICANT CHANGE UP (ref 32–36)
MCV RBC AUTO: 91.4 FL — SIGNIFICANT CHANGE UP (ref 80–100)
MONOCYTES # BLD AUTO: 0.65 K/UL — SIGNIFICANT CHANGE UP (ref 0–0.9)
MONOCYTES NFR BLD AUTO: 7.2 % — SIGNIFICANT CHANGE UP (ref 2–14)
NEUTROPHILS # BLD AUTO: 6.92 K/UL — SIGNIFICANT CHANGE UP (ref 1.8–7.4)
NEUTROPHILS NFR BLD AUTO: 76.9 % — SIGNIFICANT CHANGE UP (ref 43–77)
NITRITE UR-MCNC: NEGATIVE — SIGNIFICANT CHANGE UP
NRBC # BLD: 0 /100 WBCS — SIGNIFICANT CHANGE UP (ref 0–0)
OSMOLALITY UR: 273 MOSM/KG — LOW (ref 300–900)
PH UR: 6 — SIGNIFICANT CHANGE UP (ref 5–8)
PHOSPHATE SERPL-MCNC: 2.9 MG/DL — SIGNIFICANT CHANGE UP (ref 2.5–4.5)
PLATELET # BLD AUTO: 162 K/UL — SIGNIFICANT CHANGE UP (ref 150–400)
POTASSIUM SERPL-MCNC: 3.6 MMOL/L — SIGNIFICANT CHANGE UP (ref 3.5–5.3)
POTASSIUM SERPL-SCNC: 3.6 MMOL/L — SIGNIFICANT CHANGE UP (ref 3.5–5.3)
POTASSIUM UR-SCNC: 25 MMOL/L — SIGNIFICANT CHANGE UP
PROCALCITONIN SERPL-MCNC: 0.38 NG/ML — HIGH (ref 0.02–0.1)
PROT ?TM UR-MCNC: 14 MG/DL — HIGH (ref 0–12)
PROT SERPL-MCNC: 6.5 G/DL — SIGNIFICANT CHANGE UP (ref 6–8.3)
PROT UR-MCNC: NEGATIVE MG/DL — SIGNIFICANT CHANGE UP
PROT/CREAT UR-RTO: 0.3 RATIO — HIGH (ref 0–0.2)
PROTHROM AB SERPL-ACNC: 11.9 SEC — SIGNIFICANT CHANGE UP (ref 9.5–13)
RAPID RVP RESULT: DETECTED
RBC # BLD: 3.97 M/UL — SIGNIFICANT CHANGE UP (ref 3.8–5.2)
RBC # FLD: 13 % — SIGNIFICANT CHANGE UP (ref 10.3–14.5)
RBC CASTS # UR COMP ASSIST: 1 /HPF — SIGNIFICANT CHANGE UP (ref 0–4)
RSV RNA SPEC QL NAA+PROBE: DETECTED
RVP NOTIFY: SIGNIFICANT CHANGE UP
S PNEUM AG UR QL: NEGATIVE — SIGNIFICANT CHANGE UP
SARS-COV-2 RNA SPEC QL NAA+PROBE: SIGNIFICANT CHANGE UP
SODIUM SERPL-SCNC: 135 MMOL/L — SIGNIFICANT CHANGE UP (ref 135–145)
SODIUM UR-SCNC: 52 MMOL/L — SIGNIFICANT CHANGE UP
SP GR SPEC: 1.01 — SIGNIFICANT CHANGE UP (ref 1–1.03)
SQUAMOUS # UR AUTO: 1 /HPF — SIGNIFICANT CHANGE UP (ref 0–5)
TROPONIN T, HIGH SENSITIVITY RESULT: 25 NG/L — SIGNIFICANT CHANGE UP (ref 0–51)
UROBILINOGEN FLD QL: 0.2 MG/DL — SIGNIFICANT CHANGE UP (ref 0.2–1)
UUN UR-MCNC: 325 MG/DL — SIGNIFICANT CHANGE UP
WBC # BLD: 9 K/UL — SIGNIFICANT CHANGE UP (ref 3.8–10.5)
WBC # FLD AUTO: 9 K/UL — SIGNIFICANT CHANGE UP (ref 3.8–10.5)
WBC UR QL: 8 /HPF — HIGH (ref 0–5)

## 2024-02-04 PROCEDURE — 71250 CT THORAX DX C-: CPT | Mod: 26,MA

## 2024-02-04 PROCEDURE — 73090 X-RAY EXAM OF FOREARM: CPT | Mod: 26,RT

## 2024-02-04 PROCEDURE — 72125 CT NECK SPINE W/O DYE: CPT | Mod: 26,MA

## 2024-02-04 PROCEDURE — 93010 ELECTROCARDIOGRAM REPORT: CPT

## 2024-02-04 PROCEDURE — 71045 X-RAY EXAM CHEST 1 VIEW: CPT | Mod: 26

## 2024-02-04 PROCEDURE — 99285 EMERGENCY DEPT VISIT HI MDM: CPT

## 2024-02-04 PROCEDURE — 70450 CT HEAD/BRAIN W/O DYE: CPT | Mod: 26,MA

## 2024-02-04 RX ORDER — SODIUM CHLORIDE 9 MG/ML
1000 INJECTION INTRAMUSCULAR; INTRAVENOUS; SUBCUTANEOUS ONCE
Refills: 0 | Status: COMPLETED | OUTPATIENT
Start: 2024-02-04 | End: 2024-02-04

## 2024-02-04 RX ORDER — ATORVASTATIN CALCIUM 80 MG/1
40 TABLET, FILM COATED ORAL AT BEDTIME
Refills: 0 | Status: DISCONTINUED | OUTPATIENT
Start: 2024-02-04 | End: 2024-02-06

## 2024-02-04 RX ORDER — HEPARIN SODIUM 5000 [USP'U]/ML
5000 INJECTION INTRAVENOUS; SUBCUTANEOUS EVERY 8 HOURS
Refills: 0 | Status: DISCONTINUED | OUTPATIENT
Start: 2024-02-04 | End: 2024-02-06

## 2024-02-04 RX ORDER — IPRATROPIUM/ALBUTEROL SULFATE 18-103MCG
3 AEROSOL WITH ADAPTER (GRAM) INHALATION EVERY 6 HOURS
Refills: 0 | Status: DISCONTINUED | OUTPATIENT
Start: 2024-02-04 | End: 2024-02-06

## 2024-02-04 RX ORDER — ENOXAPARIN SODIUM 100 MG/ML
30 INJECTION SUBCUTANEOUS EVERY 24 HOURS
Refills: 0 | Status: DISCONTINUED | OUTPATIENT
Start: 2024-02-04 | End: 2024-02-04

## 2024-02-04 RX ORDER — BACITRACIN ZINC 500 UNIT/G
1 OINTMENT IN PACKET (EA) TOPICAL ONCE
Refills: 0 | Status: COMPLETED | OUTPATIENT
Start: 2024-02-04 | End: 2024-02-04

## 2024-02-04 RX ADMIN — SODIUM CHLORIDE 1000 MILLILITER(S): 9 INJECTION INTRAMUSCULAR; INTRAVENOUS; SUBCUTANEOUS at 13:04

## 2024-02-04 RX ADMIN — HEPARIN SODIUM 5000 UNIT(S): 5000 INJECTION INTRAVENOUS; SUBCUTANEOUS at 22:04

## 2024-02-04 RX ADMIN — Medication 100 MILLIGRAM(S): at 22:04

## 2024-02-04 RX ADMIN — Medication 1 APPLICATION(S): at 18:44

## 2024-02-04 RX ADMIN — Medication 3 MILLILITER(S): at 22:03

## 2024-02-04 RX ADMIN — SODIUM CHLORIDE 1000 MILLILITER(S): 9 INJECTION INTRAMUSCULAR; INTRAVENOUS; SUBCUTANEOUS at 14:26

## 2024-02-04 NOTE — ED ADULT NURSE NOTE - NSFALLRISKINTERV_ED_ALL_ED

## 2024-02-04 NOTE — H&P ADULT - PROBLEM SELECTOR PLAN 1
today she was standing in the kitchen and felt suddenly lightheaded and fainted hitting the ground on her upper back, now with discomfort upper back. In the ED orthosatic negative, however unclear if s/p fluids.   EKG Sinus Bradycardia and on admission noted to have HR 56->58. Trop wnl, recent TTE with normal LV/RV. Orthostatics Negative  DDX: Othostatic likely i/s/o decreased p.o intake vs Cardiac vs vasovagal    Plan:  -Monitor Patient reports today she was standing in the kitchen and felt suddenly lightheaded and ?LOC hitting the ground on her upper back. Did not hit her head. Fall was not witnessed but her  came into the room quickly. She was able to stand and walk unasstisted after this.   EKG Sinus Bradycardia and on admission noted to have HR 56->58. Trop wnl, recent TTE with normal LV/RV. Orthostatics Negative however unclear if s/p fluids   DDX: Othostatic likely i/s/o decreased p.o intake vs Cardiac vs vasovagal    Plan:  -check TSH  -Hold home atenolol, HCTZ Unwitnessed fall, ?LOC.  Patient reports today she was standing in the kitchen and felt suddenly lightheaded and  possibly LOC hitting the ground on her upper back, but did not hit her head. Fall was not witnessed but her  came into the room quickly. She was able to stand and walk unasstisted after this. Denies chest pain, palpitations, dyspnea prior to episode. Similar episode occurred only once before 5 years ago  EKG Sinus Bradycardia and on admission noted to have HR 56->58. Trop wnl, recent TTE with normal LV/RV. CT head/neck were negative for acute fracture. Patient endorsing pain on R am .   Orthostatics Negative however unclear if s/p fluids   DDX: Orthostatic (i/s/o decreased p.o intake) vs Cardiac (given bradycardia) vs vasovagal     Plan:  -X ray of R arm   -check TSH  -Hold home meds; Losartan-HCTZ , Atenolol 50 MG, Amodipine 5   -Consider escalation of care if patient becomes symptomatic or HR<50

## 2024-02-04 NOTE — H&P ADULT - PROBLEM SELECTOR PLAN 8
F: s/p 2L NS   DVT ppx: Lovenox  Replete: K, Mg, Phosph PRN  DIet: Dash  Dispo: [facility] On Prolia injections q6 moths. Last time was Jan 16 2024    Plan:   -no acute intervention inpatient

## 2024-02-04 NOTE — ED PROVIDER NOTE - CLINICAL SUMMARY MEDICAL DECISION MAKING FREE TEXT BOX
patient with syncope today with prodromal lightheadedness prior to event, EKG is slightly bradycardic but no block, suspect dehydration because of syncope, patient is clinically dehydrated appearing with very dry mucous membranes, patient with flulike symptoms suspect most likely viral syndrome, will evaluate for metabolic abnormalities, as well as pneumonia, patient is tender over T-spine and posterior ribs, assess for fracture, screening CT head is unclear if hit head to rule out ICH.  Dispo pending workup and reevaluation.

## 2024-02-04 NOTE — ED PROVIDER NOTE - OBJECTIVE STATEMENT
77 yo with History of hypertension, hyperlipidemia, renal insufficiency, patient reports feeling ill since Tuesday 6 days ago, with cough congestion, decreased appetite and decreased p.o. intake, nausea with dry heaving the other day but no vomiting, occasional diarrhea, nonbloody, no abdominal pain, today she was standing in the kitchen and felt suddenly lightheaded and fainted hitting the ground on her upper back, now with discomfort upper back, no chest pain, no shortness of breath, sustained multiple skin tears, able to walk afterwards, no hip pain, no headache, no current dizziness.  No blood thinners. 79 yo with History of hypertension, hyperlipidemia, renal insufficiency, patient reports feeling ill since Tuesday 6 days ago, with cough congestion, decreased appetite and decreased p.o. intake, nausea with dry heaving the other day but no vomiting, occasional diarrhea, nonbloody, no abdominal pain, today she was standing in the kitchen and felt suddenly lightheaded and fainted hitting the ground on her upper back, now with discomfort upper back, no chest pain, no shortness of breath, sustained multiple skin tears, able to walk afterwards, no hip pain, no headache, no current dizziness.  No blood thinners.  as per Dr. Vasquez creatinine baseline 1.1

## 2024-02-04 NOTE — ED PROVIDER NOTE - MUSCULOSKELETAL, MLM
Spine kyphotic , tender upper T spine and to right of T spine , no C or L spine tender, , range of motion is not limited, no muscle or joint tenderness

## 2024-02-04 NOTE — ED ADULT TRIAGE NOTE - CHIEF COMPLAINT QUOTE
"I haven't been feeling well for several days. I had my morning coffee after her diuretic then I got lightheaded and passed out, now has abrasions to her back." Denies hitting head. A&Ox4 ambulatory in triage. EKG done in triage. .

## 2024-02-04 NOTE — H&P ADULT - NSHPLABSRESULTS_GEN_ALL_CORE
.  LABS:                         12.6   9.00  )-----------( 162      ( 2024 12:34 )             36.3     02-04    135  |  98  |  45<H>  ----------------------------<  152<H>  3.6   |  22  |  2.66<H>    Ca    9.8      2024 12:34  Phos  2.9     02-04  Mg     2.2     02-04    TPro  6.5  /  Alb  3.9  /  TBili  1.2  /  DBili  x   /  AST  26  /  ALT  14  /  AlkPhos  57  02-04    PT/INR - ( 2024 12:34 )   PT: 11.9 sec;   INR: 1.05          PTT - ( 2024 12:34 )  PTT:26.2 sec  Urinalysis Basic - ( 2024 12:34 )    Color: Yellow / Appearance: Clear / S.008 / pH: x  Gluc: 152 mg/dL / Ketone: Trace mg/dL  / Bili: Negative / Urobili: 0.2 mg/dL   Blood: x / Protein: Negative mg/dL / Nitrite: Negative   Leuk Esterase: Moderate / RBC: 1 /HPF / WBC 8 /HPF   Sq Epi: x / Non Sq Epi: 1 /HPF / Bacteria: Negative /HPF      CARDIAC MARKERS ( 2024 12:34 )  x     / x     / 214 U/L / x     / 3.9 ng/mL            RADIOLOGY, EKG & ADDITIONAL TESTS: Reviewed.
Paresthesia  Paresthesia is an abnormal burning or prickling sensation. It is usually felt in the hands, arms, legs, or feet. However, it may occur in any part of the body. Usually, paresthesia is not painful. It may feel like:  Tingling or numbness.Buzzing.Itching.Paresthesia may occur without any clear cause, or it may be caused by:  Breathing too quickly (hyperventilation).Pressure on a nerve.An underlying medical condition.Side effects of a medication.Nutritional deficiencies.Exposure to toxic chemicals.Most people experience temporary (transient) paresthesia at some time in their lives. For some people, it may be long-lasting (chronic) because of an underlying medical condition. If you have paresthesia that lasts a long time, you may need to be evaluated by your health care provider.  Follow these instructions at home:  Alcohol use        Do not drink alcohol if:  Your health care provider tells you not to drink.You are pregnant, may be pregnant, or are planning to become pregnant.If you drink alcohol:  Limit how much you use to:  0–1 drink a day for women.0–2 drinks a day for men.Be aware of how much alcohol is in your drink. In the [place] one drink equals one 12 oz bottle of beer (355 mL), one 5 oz glass of wine (148 mL), or one 1½ oz glass of hard liquor (44 mL).Nutrition        Eat a healthy diet. This includes:  Eating foods that are high in fiber, such as fresh fruits and vegetables, whole grains, and beans.Limiting foods that are high in fat and processed sugars, such as fried or sweet foods.General instructions     Take over-the-counter and prescription medicines only as told by your health care provider.Do not use any products that contain nicotine or tobacco, such as cigarettes and e-cigarettes. These can keep blood from reaching damaged nerves. If you need help quitting, ask your health care provider.If you have diabetes, work closely with your health care provider to keep your blood sugar under control.If you have numbness in your feet:  Check every day for signs of injury or infection. Watch for redness, warmth, and swelling.Wear padded socks and comfortable shoes. These help protect your feet.Keep all follow-up visits as told by your health care provider. This is important.Contact a health care provider if you:  Have paresthesia that gets worse or does not go away.Have a burning or prickling feeling that gets worse when you walk.Have pain, cramps, or dizziness.Develop a rash.Get help right away if you:  Feel weak.Have trouble walking or moving.Have problems with speech, understanding, or vision.Feel confused.Cannot control your bladder or bowel movements.Have numbness after an injury.Develop new weakness in an arm or leg.Faint.Summary  Paresthesia is an abnormal burning or prickling sensation that is usually felt in the hands, arms, legs, or feet. It may also occur in other parts of the body.Paresthesia may occur without any clear cause, or it may be caused by breathing too quickly (hyperventilation), pressure on a nerve, an underlying medical condition, side effects of a medication, nutritional deficiencies, or exposure to toxic chemicals.If you have paresthesia that lasts a long time, you may need to be evaluated by your health care provider.This information is not intended to replace advice given to you by your health care provider. Make sure you discuss any questions you have with your health care provider.    Document Released: 12/08/2003 Document Revised: 01/13/2020 Document Reviewed: 12/27/2018  Elsevier Patient Education © 2020 [employer]

## 2024-02-04 NOTE — H&P ADULT - PROBLEM SELECTOR PLAN 3
Tested positive for RSV Presents with 6 day hx of cough, productive of white sputum, decreased P.O intake, nausea. No sick contacts no recent travel. Does not meet any SIRS criteria  CXR with atelectasis, but no consolidations or effusions  Ct chest: Bilateral pulmonary nodules or nodular opacities measuring up to about 1 cm .   Tested positive for RSV.     Plan:  -Supportive care: Tessalon Pearle, Hycodan cough syrup, duonebs q6, Tylenol PRN for Fever  -Incentive spirometer   -Isolation precautions  -f/u pro-calc, urine strep, legionella

## 2024-02-04 NOTE — ED ADULT NURSE NOTE - PAIN RATING/NUMBER SCALE (0-10): ACTIVITY
Care Management:      Consulted due to a risk for readmission.  Chart reviewed and the patient was discussed in Interdisciplinary Rounds.  There are no discharge needs anticipated.  Care Management will continue to monitor through Interdisciplinary Rounds and intervene if needed.  If immediate needs arise, please contact Care Management at [phone].        Joi Benavides RN, Care Coordinator      4 (moderate pain)

## 2024-02-04 NOTE — H&P ADULT - PROBLEM SELECTOR PLAN 5
Hc of HTN. Home meds;  Losartan-HCTZ , Atenolol 50 MG, Amodipine 5 MG.    Plan:   Hold Home meds i/s/o hypotension-normotensive on admission. Start as indicated

## 2024-02-04 NOTE — ED ADULT NURSE NOTE - OBJECTIVE STATEMENT
Pt is a 77 yo F here for syncopal episode and fall. Reports standing in her kitchen this morning and felt her head go "black" and remembers falling to the floor. Fall was unwitnessed but declines HS; states she fell on her arms and back with LOC for approx 5 minutes. Pt was able to get herself up,  covered abrasions to arms with bandaids and her son accompanied her here today. Denies any other injuries, blood thinners, cp prior to fall.   Notes a cough, congestion for the past few days.  On exam, pt ambulatory to room assignment with son's standby assist, multiple bandaids to bilat arms covering several abrasions and skin tears/ecchymoses. Pt speaking in full and complete sentences.

## 2024-02-04 NOTE — ED PROVIDER NOTE - SKIN, MLM
Skin normal color for race, warm, dry and intact. No evidence of rash. multple skin teears on hands / arms w deep avulsion of tissue at right forearm

## 2024-02-04 NOTE — H&P ADULT - PROBLEM SELECTOR PLAN 4
DDX: Likely prerenal i/s/o decreased P.o intake vs less likely intrinsic (given prior normal creatinine recently) vs less likely obstructive     Plan:   -Fluids prn to maintain MAP>65  -f/u Bladder scan  -F/u retroperitoneal US  -Avoid Nephrotoxic agents On admission creatinine 2.66. Baseline creat 1.1 (Nov 2023) . Patient has self-written report "Updated Dec 2023" that shows GFR 49-50.   FeNa 2.2 intrinsic. Protein/creatine ratio 0.3, no evidence of cast  DDX: Likely prerenal i/s/o decreased P.o intake vs less likely intrinsic (given prior normal creatinine recently) vs less likely obstructive     Plan:   -Fluids prn to maintain MAP>65  -f/u Bladder scan  -F/u retroperitoneal US  -Avoid Nephrotoxic agents

## 2024-02-04 NOTE — H&P ADULT - NSHPPHYSICALEXAM_GEN_ALL_CORE
.  VITAL SIGNS:  T(C): 35.7 (02-04-24 @ 17:42), Max: 36.4 (02-04-24 @ 12:05)  T(F): 96.3 (02-04-24 @ 17:42), Max: 97.5 (02-04-24 @ 12:05)  HR: 57 (02-04-24 @ 17:42) (56 - 58)  BP: 134/64 (02-04-24 @ 17:42) (98/55 - 134/64)  BP(mean): --  RR: 18 (02-04-24 @ 17:42) (17 - 18)  SpO2: 100% (02-04-24 @ 17:42) (98% - 100%)  Wt(kg): --    PHYSICAL EXAM:    Constitutional: Resting comfortably in bed; NAD  Head: NC/AT  Eyes: PERRL, EOMI, clear conjunctiva  ENT: no nasal discharge; uvula midline, no oropharyngeal erythema or exudates; MMM  Neck: supple; no JVD or thyromegaly  Respiratory: CTA B/L; no W/R/R, no retractions  Cardiac: +S1/S2; RRR; no M/R/G;  Gastrointestinal: soft, NT/ND; no rebound or guarding; +BSx4  Extremities: WWP, no clubbing or cyanosis; no peripheral edema  Musculoskeletal: NROM x4; no joint swelling, tenderness or erythema  Vascular: 2+ radial, femoral, DP/PT pulses B/L  Dermatologic: skin warm, dry and intact; no rashes, wounds, or scars  Neurologic: AAOx3; CNII-XII grossly intact; no focal deficits  Psychiatric: affect and characteristics of appearance, verbalizations, behaviors are appropriate .  VITAL SIGNS:  T(C): 35.7 (02-04-24 @ 17:42), Max: 36.4 (02-04-24 @ 12:05)  T(F): 96.3 (02-04-24 @ 17:42), Max: 97.5 (02-04-24 @ 12:05)  HR: 57 (02-04-24 @ 17:42) (56 - 58)  BP: 134/64 (02-04-24 @ 17:42) (98/55 - 134/64)  BP(mean): --  RR: 18 (02-04-24 @ 17:42) (17 - 18)  SpO2: 100% (02-04-24 @ 17:42) (98% - 100%)  Wt(kg): --    PHYSICAL EXAM:    Constitutional: Resting comfortably in bed; NAD  Head: NC/AT  Eyes: NAI, EOMI, clear conjunctiva  ENT: no nasal discharge; uvula midline, no oropharyngeal erythema or exudates; MM are dry  Neck: supple; no JVD   Respiratory: Fine crackles at the bases otherwise CTA B/L; no W/R/R, no retractions  Cardiac: +S1/S2; RRR; no M/R/G; Bradycardic  Gastrointestinal: soft, NT/ND; no rebound or guarding; +BSx4  Extremities: UE cool to palpation, RUE with superficial ulcerations bu no signs of active discharge or bleeding, wrapped in gauze. No clubbing or cyanosis; no peripheral edema   Vascular: 2+ radial, DP   Dermatologic: skin warm, dry and intact; no rashes, wounds, or scars except as noted above on RUE   Neurologic: AAOx3; follows command, strength is 4/5 in UE and LE [place]   Psychiatric: affect and characteristics of appearance, verbalizations, behaviors are appropriate

## 2024-02-04 NOTE — ED ADULT TRIAGE NOTE - NS ED TRIAGE AVPU SCALE
Alert-The patient is alert, awake and responds to voice. The patient is oriented to time, place, and person. The triage nurse is able to obtain subjective information.
Fair

## 2024-02-04 NOTE — H&P ADULT - PROBLEM SELECTOR PLAN 7
Found on CT    Plan:   -f/u outpatient On CT on 2/4: Bilateral pulmonary nodules or nodular opacities measuring up to about 1 cm as described above. A 3 month follow-up noncontrast chest CT is recommended for complete evaluation.    Plan:   -f/u outpatient

## 2024-02-04 NOTE — ED PROVIDER NOTE - CARE PLAN
1 Principal Discharge DX:	Dehydration  Secondary Diagnosis:	Acute renal failure  Secondary Diagnosis:	Viral illness

## 2024-02-04 NOTE — H&P ADULT - HISTORY OF PRESENT ILLNESS
79 yo with History of hypertension, hyperlipidemia, renal insufficiency, patient reports feeling unwell since 6 days ago, with coug,h congestion, decreased appetite and decreased p.o. intake, nausea with dry heaving the other day but no vomiting, occasional diarrhea, nonbloody, no abdominal pain, today she was standing in the kitchen and felt suddenly lightheaded and fainted hitting the ground on her upper back, now with discomfort upper back, no chest pain, no shortness of breath, sustained multiple skin tears, able to walk afterwards, no hip pain, no headache, no current dizziness.        In the ED   VS: T 97.5, HR 56, BP 98/55->117/58 s/p fluids , RR 17, SPO2 98 in RA  Labs: CBC wnl, Na 135, K 3.6, Bicab 22, creat 2.66, , CKMB 3.9, Trop 25 wnl. UA WBC 8, LE Mod, Bacteria neg, Nitrite, RVP poistive for RSV   Imaging: CT head: No acute intracranial hemorrhage or calvarial fracture.  CT cervical spine: No acute fracture or malalignment. Multilevel cervical spondylosis.  Ct chest: Bilateral pulmonary nodules or nodular opacities measuring up to about 1   cm as described above.  CXR: There may be some minimal linear atelectasis over the left   lateral costophrenic angle region. The remainder of the lung zones are   clear. There is no pneumothorax or large pleural effusions. Soft tissues   and osseous structures are intact.    EKG: Sinus Bradycardia   Interventions: NS 1L x2       79 yo with History of hypertension, hyperlipidemia, osteoporosis, ? renal insufficiency, presents after an unwitnessed fall at home today, which was preceded by lightheadedness. Patient reports that for the past 6 days she has been having cough productive of white sputum, nasal congestion, nausea decreased P.O intake, occasional diarrhea. She reports today she was standing in the kitchen and felt suddenly lightheaded and  possibly lost conscinesse hitting the ground on her upper back, but did not hit her head. Fall was not witnessed but her  came into the room quickly. She was able to stand and walk unasstisted after this. Denies chest pain, palpitations, dyspnea prior to episode. Similar episode occurred only once before 5 years ago. She also denies vomiting, abdominal pain, dysuria, urinary frequency, urgency  Patient currently ambulates without assistive devices, lives with her . She has no significant smoking, alcohol or illicit drug use hx     In the ED   VS: T 97.5, HR 56, BP 98/55->117/58 s/p fluids , RR 17, SPO2 98 in RA  Labs: CBC wnl, Na 135, K 3.6, Bicab 22, creat 2.66, , CKMB 3.9, Trop 25 wnl. UA WBC 8, LE Mod, Bacteria neg, Nitrite, RVP positive for RSV   Imaging: CT head: No acute intracranial hemorrhage or calvarial fracture.  CT cervical spine: No acute fracture or malalignment. Multilevel cervical spondylosis.  Ct chest: Bilateral pulmonary nodules or nodular opacities measuring up to about 1   cm as described above.  CXR: There may be some minimal linear atelectasis over the left  lateral costophrenic angle region. The remainder of the lung zones are clear. There is no pneumothorax or large pleural effusions. Soft tissues and osseous structures are intact.  EKG: Sinus Bradycardia   Interventions: NS 1L x2

## 2024-02-05 ENCOUNTER — TRANSCRIPTION ENCOUNTER (OUTPATIENT)
Age: 79
End: 2024-02-05

## 2024-02-05 LAB
ALBUMIN SERPL ELPH-MCNC: 3.4 G/DL — SIGNIFICANT CHANGE UP (ref 3.3–5)
ALP SERPL-CCNC: 53 U/L — SIGNIFICANT CHANGE UP (ref 40–120)
ALT FLD-CCNC: 13 U/L — SIGNIFICANT CHANGE UP (ref 10–45)
ANION GAP SERPL CALC-SCNC: 11 MMOL/L — SIGNIFICANT CHANGE UP (ref 5–17)
ANION GAP SERPL CALC-SCNC: 11 MMOL/L — SIGNIFICANT CHANGE UP (ref 5–17)
AST SERPL-CCNC: 22 U/L — SIGNIFICANT CHANGE UP (ref 10–40)
BASOPHILS # BLD AUTO: 0.02 K/UL — SIGNIFICANT CHANGE UP (ref 0–0.2)
BASOPHILS NFR BLD AUTO: 0.3 % — SIGNIFICANT CHANGE UP (ref 0–2)
BILIRUB SERPL-MCNC: 0.5 MG/DL — SIGNIFICANT CHANGE UP (ref 0.2–1.2)
BUN SERPL-MCNC: 36 MG/DL — HIGH (ref 7–23)
BUN SERPL-MCNC: 38 MG/DL — HIGH (ref 7–23)
CALCIUM SERPL-MCNC: 8.5 MG/DL — SIGNIFICANT CHANGE UP (ref 8.4–10.5)
CALCIUM SERPL-MCNC: 8.8 MG/DL — SIGNIFICANT CHANGE UP (ref 8.4–10.5)
CHLORIDE SERPL-SCNC: 105 MMOL/L — SIGNIFICANT CHANGE UP (ref 96–108)
CHLORIDE SERPL-SCNC: 105 MMOL/L — SIGNIFICANT CHANGE UP (ref 96–108)
CHOLEST SERPL-MCNC: 102 MG/DL — SIGNIFICANT CHANGE UP
CO2 SERPL-SCNC: 24 MMOL/L — SIGNIFICANT CHANGE UP (ref 22–31)
CO2 SERPL-SCNC: 25 MMOL/L — SIGNIFICANT CHANGE UP (ref 22–31)
CREAT SERPL-MCNC: 1.62 MG/DL — HIGH (ref 0.5–1.3)
CREAT SERPL-MCNC: 1.89 MG/DL — HIGH (ref 0.5–1.3)
EGFR: 27 ML/MIN/1.73M2 — LOW
EGFR: 33 ML/MIN/1.73M2 — LOW
EOSINOPHIL # BLD AUTO: 0.14 K/UL — SIGNIFICANT CHANGE UP (ref 0–0.5)
EOSINOPHIL NFR BLD AUTO: 2.1 % — SIGNIFICANT CHANGE UP (ref 0–6)
GLUCOSE SERPL-MCNC: 117 MG/DL — HIGH (ref 70–99)
GLUCOSE SERPL-MCNC: 94 MG/DL — SIGNIFICANT CHANGE UP (ref 70–99)
HCT VFR BLD CALC: 33 % — LOW (ref 34.5–45)
HCV AB S/CO SERPL IA: 0.04 S/CO — SIGNIFICANT CHANGE UP
HCV AB SERPL-IMP: SIGNIFICANT CHANGE UP
HDLC SERPL-MCNC: 30 MG/DL — LOW
HGB BLD-MCNC: 11.2 G/DL — LOW (ref 11.5–15.5)
IMM GRANULOCYTES NFR BLD AUTO: 0.2 % — SIGNIFICANT CHANGE UP (ref 0–0.9)
LIPID PNL WITH DIRECT LDL SERPL: 43 MG/DL — SIGNIFICANT CHANGE UP
LYMPHOCYTES # BLD AUTO: 2.1 K/UL — SIGNIFICANT CHANGE UP (ref 1–3.3)
LYMPHOCYTES # BLD AUTO: 31.7 % — SIGNIFICANT CHANGE UP (ref 13–44)
MAGNESIUM SERPL-MCNC: 2.2 MG/DL — SIGNIFICANT CHANGE UP (ref 1.6–2.6)
MCHC RBC-ENTMCNC: 31.6 PG — SIGNIFICANT CHANGE UP (ref 27–34)
MCHC RBC-ENTMCNC: 33.9 GM/DL — SIGNIFICANT CHANGE UP (ref 32–36)
MCV RBC AUTO: 93.2 FL — SIGNIFICANT CHANGE UP (ref 80–100)
MONOCYTES # BLD AUTO: 0.4 K/UL — SIGNIFICANT CHANGE UP (ref 0–0.9)
MONOCYTES NFR BLD AUTO: 6 % — SIGNIFICANT CHANGE UP (ref 2–14)
NEUTROPHILS # BLD AUTO: 3.95 K/UL — SIGNIFICANT CHANGE UP (ref 1.8–7.4)
NEUTROPHILS NFR BLD AUTO: 59.7 % — SIGNIFICANT CHANGE UP (ref 43–77)
NON HDL CHOLESTEROL: 72 MG/DL — SIGNIFICANT CHANGE UP
NRBC # BLD: 0 /100 WBCS — SIGNIFICANT CHANGE UP (ref 0–0)
PHOSPHATE SERPL-MCNC: 4.3 MG/DL — SIGNIFICANT CHANGE UP (ref 2.5–4.5)
PLATELET # BLD AUTO: 142 K/UL — LOW (ref 150–400)
POTASSIUM SERPL-MCNC: 3.2 MMOL/L — LOW (ref 3.5–5.3)
POTASSIUM SERPL-MCNC: 4 MMOL/L — SIGNIFICANT CHANGE UP (ref 3.5–5.3)
POTASSIUM SERPL-SCNC: 3.2 MMOL/L — LOW (ref 3.5–5.3)
POTASSIUM SERPL-SCNC: 4 MMOL/L — SIGNIFICANT CHANGE UP (ref 3.5–5.3)
PROT SERPL-MCNC: 5.8 G/DL — LOW (ref 6–8.3)
RBC # BLD: 3.54 M/UL — LOW (ref 3.8–5.2)
RBC # FLD: 13.2 % — SIGNIFICANT CHANGE UP (ref 10.3–14.5)
SODIUM SERPL-SCNC: 140 MMOL/L — SIGNIFICANT CHANGE UP (ref 135–145)
SODIUM SERPL-SCNC: 141 MMOL/L — SIGNIFICANT CHANGE UP (ref 135–145)
TRIGL SERPL-MCNC: 147 MG/DL — SIGNIFICANT CHANGE UP
TSH SERPL-MCNC: 1.68 UIU/ML — SIGNIFICANT CHANGE UP (ref 0.27–4.2)
WBC # BLD: 6.62 K/UL — SIGNIFICANT CHANGE UP (ref 3.8–10.5)
WBC # FLD AUTO: 6.62 K/UL — SIGNIFICANT CHANGE UP (ref 3.8–10.5)

## 2024-02-05 PROCEDURE — 99232 SBSQ HOSP IP/OBS MODERATE 35: CPT | Mod: GC

## 2024-02-05 RX ORDER — TETANUS AND DIPHTHERIA TOXOIDS ADSORBED 2; 2 [LF]/.5ML; [LF]/.5ML
0.5 INJECTION INTRAMUSCULAR ONCE
Refills: 0 | Status: DISCONTINUED | OUTPATIENT
Start: 2024-02-05 | End: 2024-02-05

## 2024-02-05 RX ORDER — TETANUS TOXOID, REDUCED DIPHTHERIA TOXOID AND ACELLULAR PERTUSSIS VACCINE, ADSORBED 5; 2.5; 8; 8; 2.5 [IU]/.5ML; [IU]/.5ML; UG/.5ML; UG/.5ML; UG/.5ML
0.5 SUSPENSION INTRAMUSCULAR ONCE
Refills: 0 | Status: COMPLETED | OUTPATIENT
Start: 2024-02-05 | End: 2024-02-05

## 2024-02-05 RX ORDER — POTASSIUM CHLORIDE 20 MEQ
40 PACKET (EA) ORAL EVERY 4 HOURS
Refills: 0 | Status: COMPLETED | OUTPATIENT
Start: 2024-02-05 | End: 2024-02-05

## 2024-02-05 RX ORDER — SODIUM CHLORIDE 9 MG/ML
500 INJECTION INTRAMUSCULAR; INTRAVENOUS; SUBCUTANEOUS ONCE
Refills: 0 | Status: COMPLETED | OUTPATIENT
Start: 2024-02-05 | End: 2024-02-05

## 2024-02-05 RX ORDER — POTASSIUM CHLORIDE 20 MEQ
40 PACKET (EA) ORAL ONCE
Refills: 0 | Status: DISCONTINUED | OUTPATIENT
Start: 2024-02-05 | End: 2024-02-05

## 2024-02-05 RX ADMIN — Medication 3 MILLILITER(S): at 17:24

## 2024-02-05 RX ADMIN — HEPARIN SODIUM 5000 UNIT(S): 5000 INJECTION INTRAVENOUS; SUBCUTANEOUS at 21:23

## 2024-02-05 RX ADMIN — HEPARIN SODIUM 5000 UNIT(S): 5000 INJECTION INTRAVENOUS; SUBCUTANEOUS at 05:16

## 2024-02-05 RX ADMIN — Medication 100 MILLIGRAM(S): at 21:23

## 2024-02-05 RX ADMIN — Medication 40 MILLIEQUIVALENT(S): at 13:45

## 2024-02-05 RX ADMIN — Medication 3 MILLILITER(S): at 05:15

## 2024-02-05 RX ADMIN — Medication 100 MILLIGRAM(S): at 05:16

## 2024-02-05 RX ADMIN — TETANUS TOXOID, REDUCED DIPHTHERIA TOXOID AND ACELLULAR PERTUSSIS VACCINE, ADSORBED 0.5 MILLILITER(S): 5; 2.5; 8; 8; 2.5 SUSPENSION INTRAMUSCULAR at 19:08

## 2024-02-05 RX ADMIN — HEPARIN SODIUM 5000 UNIT(S): 5000 INJECTION INTRAVENOUS; SUBCUTANEOUS at 13:45

## 2024-02-05 RX ADMIN — Medication 40 MILLIEQUIVALENT(S): at 09:50

## 2024-02-05 RX ADMIN — Medication 3 MILLILITER(S): at 11:18

## 2024-02-05 RX ADMIN — Medication 100 MILLIGRAM(S): at 13:41

## 2024-02-05 RX ADMIN — Medication 3 MILLILITER(S): at 21:23

## 2024-02-05 RX ADMIN — ATORVASTATIN CALCIUM 40 MILLIGRAM(S): 80 TABLET, FILM COATED ORAL at 21:23

## 2024-02-05 RX ADMIN — SODIUM CHLORIDE 250 MILLILITER(S): 9 INJECTION INTRAMUSCULAR; INTRAVENOUS; SUBCUTANEOUS at 19:08

## 2024-02-05 NOTE — DISCHARGE NOTE PROVIDER - NSDCMRMEDTOKEN_GEN_ALL_CORE_FT
Calcium 600+D Plus Minerals oral tablet, chewable: 1 tab(s) chewed once a day  magnesium oxide 400 mg oral tablet: 1 tab(s) orally once a day  Prolia 60 mg/mL subcutaneous solution: 60 milligram(s) subcutaneously every 6 months  rosuvastatin 10 mg oral tablet: 1 tab(s) orally once a day

## 2024-02-05 NOTE — DISCHARGE NOTE PROVIDER - NSDCFUSCHEDAPPT_GEN_ALL_CORE_FT
Yudi Mondragon  [facility]  [facility] [address]  Scheduled Appointment: 02/12/2024    Daxa Vasquez  [facility]  INTMED [address]  Scheduled Appointment: 02/20/2024    Lizette Cuevas  [facility]  [facility] [address]  Scheduled Appointment: 02/27/2024    Daxa Vasquez  [facility]  INTMED [address]  Scheduled Appointment: 03/12/2024     Yudi Mondragon Physician [facility]  HEART[facility]ASC [address]  Scheduled Appointment: 02/12/2024    Daxa Vasquez Physician [facility]  INTMED [address]  Scheduled Appointment: 02/20/2024    Daxa Vasquez Physician [facility]  INTMED [address]  Scheduled Appointment: 03/12/2024

## 2024-02-05 NOTE — PHYSICAL THERAPY INITIAL EVALUATION ADULT - GENERAL OBSERVATIONS, REHAB EVAL
PT IE Completed. Chart reviewed. KATHY Woody aware of PT IE. Pt received in semi-supine in NAD and +primafit. Pt left as received in NAD and call rome within reach. KATHY Woody aware after PT IE. PT IE Completed. Chart reviewed. KATHY Woody aware of PT IE. Pt received in semi-supine in NAD, +heplock and +primafit. Pt left as received in NAD and call rome within reach. KATHY Woody aware after PT IE.

## 2024-02-05 NOTE — PHYSICAL THERAPY INITIAL EVALUATION ADULT - PERTINENT HX OF CURRENT PROBLEM, REHAB EVAL
79 yo with History of hypertension, hyperlipidemia, osteoporosis, ? renal insufficiency, presents after an unwitnessed fall at home today, which was preceded by lightheadedness. Patient reports that for the past 6 days she has been having cough productive of white sputum, nasal congestion, nausea decreased P.O intake, occasional diarrhea. She reports today she was standing in the kitchen and felt suddenly lightheaded and  possibly lost conscinesse hitting the ground on her upper back, but did not hit her head. Fall was not witnessed but her  came into the room quickly. She was able to stand and walk unasstisted after this. Denies chest pain, palpitations, dyspnea prior to episode. Similar episode occurred only once before 5 years ago. She also denies vomiting, abdominal pain, dysuria, urinary frequency, urgency.

## 2024-02-05 NOTE — PHYSICAL THERAPY INITIAL EVALUATION ADULT - CRITERIA FOR SKILLED THERAPEUTIC INTERVENTIONS
impairments found/functional limitations in following categories/risk reduction/prevention/predicted duration of therapy intervention/anticipated equipment needs at discharge/anticipated discharge recommendation

## 2024-02-05 NOTE — DISCHARGE NOTE PROVIDER - NSDCCPCAREPLAN_GEN_ALL_CORE_FT
PRINCIPAL DISCHARGE DIAGNOSIS  Diagnosis: KATIE (acute kidney injury)  Assessment and Plan of Treatment: Acute kidney injury is when the kidney function worsens. Normally, the kidneys filter the blood and remove waste and excess salt and water. The word "acute" means sudden. Another term for acute kidney injury is "acute kidney failure." Acute kidney injury can have different causes. It can happen when there is less blood flow than usual to the kidneys, when the kidneys get damaged (from infections, cancer, certain medications, or autoimmune conditions), or when the path for urine to leave the body is blocked (a common example is prostate enlargement in men). Some people do not have any symptoms at first. People who are in the hospital might learn that they have acute kidney injury after they have blood tests for another reason. When people do have symptoms, the symptoms can include: urinating less or not at all, blood in the urine, swelling in the legs, vomiting, feeling weak, or acting confused. You had an acute kidney injury most likely due to decreased oral intake. Your kidney function improved during the course of this hospitalization after the fluids that we provided and the improvement in your oral intake. Upon discharge from the hospital, please continue staying hydrated and eat well and please follow up with your primary care doctor within 1-2 weeks.         SECONDARY DISCHARGE DIAGNOSES  Diagnosis: HTN (hypertension)  Assessment and Plan of Treatment: You have a history of high blood pressure. However, during this hospitalization your blood pressure was in the low side, and for this reason we did not start any of your blood pressure medications while you were here in the hospital. Upon discharge from the hospital, please stop taking your blood pressure medications until you see your primary care doctor and at that time can determine what medications to restart.

## 2024-02-05 NOTE — PHYSICAL THERAPY INITIAL EVALUATION ADULT - BALANCE DISTURBANCE, IDENTIFIED IMPAIRMENT CONTRIBUTE, REHAB EVAL
Patient often moves quickly and causes her to weightshift too much on one side and causes unsteadiness while standing or ambulating/decreased strength Patient often moves quickly and causes her to weightshift too much on one side and causes unsteadiness while standing or ambulating/impaired postural control/decreased strength

## 2024-02-05 NOTE — PATIENT PROFILE ADULT - FALL HARM RISK - HARM RISK INTERVENTIONS
Assistance with ambulation/Assistance OOB with selected safe patient handling equipment/Communicate Risk of Fall with Harm to all staff/Monitor gait and stability/Reinforce activity limits and safety measures with patient and family/Sit up slowly, dangle for a short time, stand at bedside before walking/Tailored Fall Risk Interventions/Visual Cue: Yellow wristband and red socks/Bed in lowest position, wheels locked, appropriate side rails in place/Call bell, personal items and telephone in reach/Instruct patient to call for assistance before getting out of bed or chair/Non-slip footwear when patient is out of bed/[place] to call system/Physically safe environment - no spills, clutter or unnecessary equipment/Purposeful Proactive Rounding/Room/bathroom lighting operational, light cord in reach

## 2024-02-05 NOTE — CONSULT NOTE ADULT - ASSESSMENT
{\rtf1\zybnro56764\ansi\cmjnzjp5826\ftnbj\uc1\deff0  {\fonttbl{\f0 \fnil Segoe UI;}{\f1 \fnil \fcharset0 Segoe UI;}{\f2 \fnil Times New Oz;}}  {\colortbl ;\hyu022\yldbe408\lawf732 ;\red0\green0\blue0 ;\red0\green0\faqm944 ;\red0\green0\blue0 ;}  {\stylesheet{\f0\fs20 Normal;}{\cs1 Default Paragraph Font;}{\cs2\f0\fs16 Line Number;}{\cs3\f2\fs24\ul\cf3 Hyperlink;}}  {\*\revtbl{Unknown;}}  \ulkjyd48573\saeflb52914\wqxqr2172\qwlzw6466\lemyj7713\fmkkr5222\emonfus446\wjfnozi222\nogrowautofit\vzfxaw383\formshade\nofeaturethrottle1\dntblnsbdb\fet4\aendnotes\aftnnrlc\pgbrdrhead\pgbrdrfoot  \sectd\duvfsi49365\lcekfb51218\guttersxn0\vfiixalj2237\jmgxeqjx0778\kurjndne7659\kqjdejwe9439\mlirpeo297\neurtlv412\sbkpage\pgncont\pgndec  \plain\plain\f0\fs24\ql\plain\f0\fs24\plain\f0\fs20\svrj6165\hich\f0\dbch\f0\loch\f0\fs20\par  I M\par  \par  78 y o with History of hypertension, hyperlipidemia, osteoporosis, ? renal insufficiency, patient presents with 6 day hx of cough, congestion, nausea decreased P.O intake occasional diarrhea and a fall preceded by lightheadedness on the day of admission.   She was found to have bradycardia and KATIE, admitted for further work up of presyncope and treatment of KATIE \par  \plain\f1\fs20\psih5562\hich\f1\dbch\f1\loch\f1\cf2\fs20\strike\plain\f1\fs20\pwrt0505\hich\f1\dbch\f1\loch\f1\cf2\fs20\plain\f0\fs20\brqz6545\hich\f0\dbch\f0\loch\f0\fs20\par  \plain\f1\fs20\waks4223\hich\f1\dbch\f1\loch\f1\cf2\fs20\ul{\field{\*\fldinst HYPERLINK [id number] }{\fldrslt Problem/Plan - 1:}}\plain\f0\fs20\htsu9108\hich\f0\dbch\f0\loch\f0\fs20\ql\par  \'b7  {\*\bkmkstart [id number]}{\*\bkmkend zk48238487143}Problem: {\*\bkmkstart ss76850421796}{\*\bkmkend ev30233099328}Fall.\plain\f1\fs20\zpau8408\hich\f1\dbch\f1\loch\f1\cf2\fs20\strike\plain\f0\fs20\duze3746\hich\f0\dbch\f0\loch\f0\fs20\par  \'b7  {\*\bkmkstart bk83189909955}{\*\bkmkend uu59168489390}Plan: {\*\bkmkstart gn39477777337}{\*\bkmkend fa76387247203}Unwitnessed fall, ?LOC.\par  Patient reports today she was standing in the kitchen and felt suddenly lightheaded and  possibly LOC hitting the ground on her upper back, but did not hit her head. Fall was not witnessed but her  came into the room quickly. She was able to stand   and walk unasstisted after this. Denies chest pain, palpitations, dyspnea prior to episode. Similar episode occurred only once before 5 years ago\par  EKG Sinus Bradycardia and on admission noted to have HR 56->58. Trop wnl, recent TTE with normal LV/RV. CT head/neck were negative for acute fracture. Patient endorsing pain on R am . \par  Orthostatics Negative however unclear if s/p fluids \par  DDX: Orthostatic (i/s/o decreased p.o intake) vs Cardiac (given bradycardia) vs vasovagal \par  \par  Plan:\par  -X ray of R arm \par  -check TSH\par  -Hold home meds; Losartan-HCTZ , Atenolol 50 MG, Amodipine 5 \par  -Consider escalation of care if patient becomes symptomatic or HR<50.\plain\f1\fs20\sqxn0462\hich\f1\dbch\f1\loch\f1\cf2\fs20\strike\plain\f0\fs20\chkl6088\hich\f0\dbch\f0\loch\f0\fs20\par  \par  \plain\f1\fs20\vhqk4099\hich\f1\dbch\f1\loch\f1\cf2\fs20\ul{\field{\*\fldinst HYPERLINK [id number] }{\fldrslt Problem/Plan - 2:}}\plain\f0\fs20\udrs1478\hich\f0\dbch\f0\loch\f0\fs20\ql\par  \'b7  {\*\bkmkstart sv80896523973}{\*\bkmkend cv90985778625}Problem: {\*\bkmkstart qj33752010978}{\*\bkmkend wj79030848008}Bradycardia. \par  \'b7  {\*\bkmkstart ld64616697709}{\*\bkmkend ed96512881554}Plan: {\*\bkmkstart dw44576158276}{\*\bkmkend vk92946229676}TTe on Jan 2024: Normal LV systolic and diaastolic function EF 65%. Normal RV, mild mitral and tricuspid regurg. \par  EKG with sinus bradycardia, QTc 416. On admission noted to have HR 56->58. Trop wnl, recent TTE with normal LV/RV. Currently asymtpomatic \par  \par  Plan: \par  -As above\par  -Maintain Mg>2, K>4.\plain\f1\fs20\uock5443\hich\f1\dbch\f1\loch\f1\cf2\fs20\strike\plain\f0\fs20\bxrp2295\hich\f0\dbch\f0\loch\f0\fs20\par  \par  \plain\f1\fs20\cwmg6681\hich\f1\dbch\f1\loch\f1\cf2\fs20\ul{\field{\*\fldinst HYPERLINK [id number] }{\fldrslt Problem/Plan - 3:}}\plain\f0\fs20\rfxy7331\hich\f0\dbch\f0\loch\f0\fs20\ql\par  \'b7  {\*\bkmkstart gm25494118521}{\*\bkmkend wu47653489644}Problem: {\*\bkmkstart tq07226646127}{\*\bkmkend zo49951169911}Respiratory syncytial virus (RSV). \par  \'b7  {\*\bkmkstart uk80545059346}{\*\bkmkend aw05044324347}Plan: {\*\bkmkstart oz92902509103}{\*\bkmkend fk15212245097}Presents with 6 day hx of cough, productive of white sputum, decreased P.O intake, nausea. No sick contacts no recent travel. Does   not meet any SIRS criteria\par  CXR with atelectasis, but no consolidations or effusions\par  Ct chest: Bilateral pulmonary nodules or nodular opacities measuring up to about 1 cm . \par  Tested positive for RSV. \par  \par  Plan:\par  -Supportive care: Tessalon Pearle, Hycodan cough syrup, duonebs q6, Tylenol PRN for Fever\par  -Incentive spirometer \par  -Isolation precautions\par  -f/u pro-calc, urine strep, legionella.\plain\f1\fs20\qdzh1191\hich\f1\dbch\f1\loch\f1\cf2\fs20\strike\plain\f0\fs20\vlhg5510\hich\f0\dbch\f0\loch\f0\fs20\par  \par  \plain\f1\fs20\wgfr9463\hich\f1\dbch\f1\loch\f1\cf2\fs20\ul{\field{\*\fldinst HYPERLINK [id number] }{\fldrslt Problem/Plan - 4:}}\plain\f0\fs20\gvsi4398\hich\f0\dbch\f0\loch\f0\fs20\ql\par  \'b7  {\*\bkmkstart aq73724911012}{\*\bkmkend vf38574393295}Problem: {\*\bkmkstart my80707038324}{\*\bkmkend zf58003126610}KATIE (acute kidney injury). \par  \'b7  {\*\bkmkstart tb57409681730}{\*\bkmkend fj47392850830}Plan: {\*\bkmkstart oz40071891947}{\*\bkmkend jg85246918206}On admission creatinine 2.66. Baseline creat 1.1 (Nov 2023) . Patient has self-written report "Updated Dec 2023" that shows GFR 49-50. \par  FeNa 2.2 intrinsic. Protein/creatine ratio 0.3, no evidence of cast\par  DDX: Likely prerenal i/s/o decreased P.o intake vs less likely intrinsic (given prior normal creatinine recently) vs less likely obstructive \par  \par  Plan: \par  -Fluids prn to maintain MAP>65\par  -f/u Bladder scan\par  -F/u retroperitoneal US\par  -Avoid Nephrotoxic agents.\plain\f1\fs20\nrmv5952\hich\f1\dbch\f1\loch\f1\cf2\fs20\strike\plain\f0\fs20\wlch2313\hich\f0\dbch\f0\loch\f0\fs20\par  \par  \plain\f1\fs20\xdam2060\hich\f1\dbch\f1\loch\f1\cf2\fs20\ul{\field{\*\fldinst HYPERLINK [id number] }{\fldrslt Problem/Plan - 5:}}\plain\f0\fs20\tiog2779\hich\f0\dbch\f0\loch\f0\fs20\ql\par  \'b7  {\*\bkmkstart vr81253764580}{\*\bkmkend nm14418294684}Problem: {\*\bkmkstart eh25884701851}{\*\bkmkend gm07750536388}HTN (hypertension). \par  \'b7  {\*\bkmkstart cw28165131410}{\*\bkmkend jr42134313390}Plan:\plain\f1\fs20\kmnf6515\hich\f1\dbch\f1\loch\f1\cf2\fs20\strike\plain\f0\fs20\pald1938\hich\f0\dbch\f0\loch\f0\fs20  {\*\bkmkstart vf95553459282}{\*\bkmkend er11558424841}Hc of HTN. Home   meds;  Losartan-HCTZ , Atenolol 50 MG, Amodipine 5 MG.\par  \par  Plan: \par  Hold Home meds i/s/o hypotension-normotensive on admission. Start as indicated.\par  \par  \plain\f1\fs20\wwjw8047\hich\f1\dbch\f1\loch\f1\cf2\fs20\ul{\field{\*\fldinst HYPERLINK [id number] }{\fldrslt Problem/Plan - 6:}}\plain\f0\fs20\enfs0416\hich\f0\dbch\f0\loch\f0\fs20\ql\par  \'b7  {\*\bkmkstart kb03671719860}{\*\bkmkend qd80721768945}Problem: {\*\bkmkstart ej04586951570}{\*\bkmkend va88158353465}HLD (hyperlipidemia). \par  \'b7  {\*\bkmkstart xw52838371694}{\*\bkmkend wp21433177395}Plan:\plain\f1\fs20\lgbt3099\hich\f1\dbch\f1\loch\f1\cf2\fs20\strike\plain\f0\fs20\lftk4622\hich\f0\dbch\f0\loch\f0\fs20  {\*\bkmkstart zx02780896576}{\*\bkmkend ey05286340888}On home Rosuvastatin   10mg \par  \par  Plan: \par  Start Rosuvastatin 10\par  check lipid panel.\par  \par  \plain\f1\fs20\gcir3208\hich\f1\dbch\f1\loch\f1\cf2\fs20\ul{\field{\*\fldinst HYPERLINK [id number] }{\fldrslt Problem/Plan - 7:}}\plain\f0\fs20\pkyd9002\hich\f0\dbch\f0\loch\f0\fs20\ql\par  \'b7  {\*\bkmkstart vc87403102327}{\*\bkmkend gm96640938774}Problem: {\*\bkmkstart ah84087900908}{\*\bkmkend ak19265782815}Pulmonary nodules. \par  \'b7  {\*\bkmkstart zu96957419790}{\*\bkmkend mx27838682897}Plan: {\*\bkmkstart uz04680285155}{\*\bkmkend bm27534552274}On CT on 2/4: Bilateral pulmonary nodules or nodular opacities measuring up to about 1 cm as described above. A 3 month follow-up noncontrast   chest CT is recommended for complete evaluation.\par  \par  Plan: \par  -f/u outpatient.\plain\f1\fs20\lrtq8166\hich\f1\dbch\f1\loch\f1\cf2\fs20\strike\plain\f0\fs20\snss8548\hich\f0\dbch\f0\loch\f0\fs20\par  \par  \plain\f1\fs20\wsrz3338\hich\f1\dbch\f1\loch\f1\cf2\fs20\ul{\field{\*\fldinst HYPERLINK [id number] }{\fldrslt Problem/Plan - 8:}}\plain\f0\fs20\kssk6640\hich\f0\dbch\f0\loch\f0\fs20\ql\par  \'b7  {\*\bkmkstart ox82702449468}{\*\bkmkend uy70753843351}Problem: {\*\bkmkstart ej84377836606}{\*\bkmkend nw44668829827}Osteoporosis.\plain\f1\fs20\blci9181\hich\f1\dbch\f1\loch\f1\cf2\fs20\strike\plain\f0\fs20\vccv3614\hich\f0\dbch\f0\loch\f0\fs20\par  \'b7  {\*\bkmkstart ag28676126997}{\*\bkmkend rb58695485210}Plan: {\*\bkmkstart mh52554511383}{\*\bkmkend ud32012296084}On Prolia injections q6 moths. Last time was Jan 16 2024\par  \par  Plan: \par  -no acute intervention inpatient.\plain\f1\fs20\bdzg3958\hich\f1\dbch\f1\loch\f1\cf2\fs20\strike\plain\f0\fs20\fjrf4254\hich\f0\dbch\f0\loch\f0\fs20\par  \par  \plain\f1\fs20\rvzd6562\hich\f1\dbch\f1\loch\f1\cf2\fs20\ul{\field{\*\fldinst HYPERLINK [id number] }{\fldrslt Problem/Plan - 9:}}\plain\f0\fs20\rfmk5269\hich\f0\dbch\f0\loch\f0\fs20\ql\par  \'b7  {\*\bkmkstart bq83795612982}{\*\bkmkend fp28316574862}Problem: {\*\bkmkstart sc25714893655}{\*\bkmkend ag51078255328}Prophylactic measure. \par  \'b7  {\*\bkmkstart nh10313642028}{\*\bkmkend iv28003930685}Plan: {\*\bkmkstart mu47440193581}{\*\bkmkend ov87848577651}F: s/p 2L NS \par  DVT ppx: Lovenox\par  Replete: K, Mg, Phosph PRN\par  DIet: DASH\par  Dispo: RMF{\*\bkmkstart bkcommentCR}{\*\bkmkend bkcommentCR}.\par  \par  \par  \par  }

## 2024-02-05 NOTE — PROGRESS NOTE ADULT - SUBJECTIVE AND OBJECTIVE BOX
**INCOMPLETE NOTE    OVERNIGHT EVENTS:    SUBJECTIVE:  Patient seen and examined at bedside.    Vital Signs Last 12 Hrs  T(F): 97.5 (02-05-24 @ 06:27), Max: 97.7 (02-04-24 @ 22:38)  HR: 55 (02-05-24 @ 06:27) (55 - 60)  BP: 118/59 (02-05-24 @ 06:27) (101/62 - 157/69)  BP(mean): --  RR: 18 (02-05-24 @ 06:27) (18 - 18)  SpO2: 98% (02-05-24 @ 06:27) (98% - 100%)  I&O's Summary    04 Feb 2024 07:01  -  05 Feb 2024 07:00  --------------------------------------------------------  IN: 0 mL / OUT: 600 mL / NET: -600 mL        PHYSICAL EXAM:  Constitutional: NAD, comfortable in bed.  HEENT: NC/AT, PERRLA, EOMI, no conjunctival pallor or scleral icterus, MMM  Neck: Supple, no JVD  Respiratory: CTA B/L. No w/r/r.   Cardiovascular: RRR, normal S1 and S2, no m/r/g.   Gastrointestinal: +BS, soft NTND, no guarding or rebound tenderness, no palpable masses   Extremities: wwp; no cyanosis, clubbing or edema.   Vascular: Pulses equal and strong throughout.   Neurological: AAOx3, no CN deficits, strength and sensation intact throughout.   Skin: No gross skin abnormalities or rashes        LABS:                        11.2   6.62  )-----------( 142      ( 05 Feb 2024 05:30 )             33.0     02-05    140  |  105  |  38<H>  ----------------------------<  94  3.2<L>   |  24  |  1.89<H>    Ca    8.8      05 Feb 2024 05:30  Phos  4.3     02-05  Mg     2.2     02-05    TPro  5.8<L>  /  Alb  3.4  /  TBili  0.5  /  DBili  x   /  AST  22  /  ALT  13  /  AlkPhos  53  02-05    PT/INR - ( 04 Feb 2024 12:34 )   PT: 11.9 sec;   INR: 1.05          PTT - ( 04 Feb 2024 12:34 )  PTT:26.2 sec  Urinalysis Basic - ( 05 Feb 2024 05:30 )    Color: x / Appearance: x / SG: x / pH: x  Gluc: 94 mg/dL / Ketone: x  / Bili: x / Urobili: x   Blood: x / Protein: x / Nitrite: x   Leuk Esterase: x / RBC: x / WBC x   Sq Epi: x / Non Sq Epi: x / Bacteria: x          RADIOLOGY & ADDITIONAL TESTS:    MEDICATIONS  (STANDING):  albuterol/ipratropium for Nebulization 3 milliLiter(s) Nebulizer every 6 hours  atorvastatin 40 milliGRAM(s) Oral at bedtime  benzonatate 100 milliGRAM(s) Oral every 8 hours  heparin   Injectable 5000 Unit(s) SubCutaneous every 8 hours  potassium chloride   Powder 40 milliEquivalent(s) Oral every 4 hours    MEDICATIONS  (PRN):   OVERNIGHT EVENTS: PETER     SUBJECTIVE:  Patient seen and examined at bedside. She reports she feels better today and the coughing has improved significantly. She denies fever, chills, dyspnea, abdominal pain, n/v/d.     Vital Signs Last 12 Hrs  T(F): 97.5 (02-05-24 @ 06:27), Max: 97.7 (02-04-24 @ 22:38)  HR: 55 (02-05-24 @ 06:27) (55 - 60)  BP: 118/59 (02-05-24 @ 06:27) (101/62 - 157/69)  BP(mean): --  RR: 18 (02-05-24 @ 06:27) (18 - 18)  SpO2: 98% (02-05-24 @ 06:27) (98% - 100%)  I&O's Summary    04 Feb 2024 07:01  -  05 Feb 2024 07:00  --------------------------------------------------------  IN: 0 mL / OUT: 600 mL / NET: -600 mL        PHYSICAL EXAM:  Constitutional: Resting comfortably in bed; NAD  Head: NC/AT  Eyes: NAI, EOMI, clear conjunctiva  ENT: no nasal discharge; uvula midline, no oropharyngeal erythema or exudates; MM are dry  Neck: supple; no JVD   Respiratory: Fine crackles at the bases otherwise CTA B/L; no W/R/R, no retractions  Cardiac: +S1/S2; RRR; no M/R/G; Bradycardic  Gastrointestinal: soft, NT/ND; no rebound or guarding; +BSx4  Extremities: UE cool to palpation, RUE with superficial ulcerations bu no signs of active discharge or bleeding, wrapped in gauze. No clubbing or cyanosis; no peripheral edema   Vascular: 2+ radial, DP   Dermatologic: skin warm, dry and intact; no rashes, wounds, or scars except as noted above on RUE   Neurologic: AAOx3; follows command, strength is 4/5 in UE and LE [place]   Psychiatric: affect and characteristics of appearance, verbalizations, behaviors are appropriate      LABS:                        11.2   6.62  )-----------( 142      ( 05 Feb 2024 05:30 )             33.0     02-05    140  |  105  |  38<H>  ----------------------------<  94  3.2<L>   |  24  |  1.89<H>    Ca    8.8      05 Feb 2024 05:30  Phos  4.3     02-05  Mg     2.2     02-05    TPro  5.8<L>  /  Alb  3.4  /  TBili  0.5  /  DBili  x   /  AST  22  /  ALT  13  /  AlkPhos  53  02-05    PT/INR - ( 04 Feb 2024 12:34 )   PT: 11.9 sec;   INR: 1.05          PTT - ( 04 Feb 2024 12:34 )  PTT:26.2 sec  Urinalysis Basic - ( 05 Feb 2024 05:30 )    Color: x / Appearance: x / SG: x / pH: x  Gluc: 94 mg/dL / Ketone: x  / Bili: x / Urobili: x   Blood: x / Protein: x / Nitrite: x   Leuk Esterase: x / RBC: x / WBC x   Sq Epi: x / Non Sq Epi: x / Bacteria: x          RADIOLOGY & ADDITIONAL TESTS:    MEDICATIONS  (STANDING):  albuterol/ipratropium for Nebulization 3 milliLiter(s) Nebulizer every 6 hours  atorvastatin 40 milliGRAM(s) Oral at bedtime  benzonatate 100 milliGRAM(s) Oral every 8 hours  heparin   Injectable 5000 Unit(s) SubCutaneous every 8 hours  potassium chloride   Powder 40 milliEquivalent(s) Oral every 4 hours    MEDICATIONS  (PRN):

## 2024-02-05 NOTE — ADVANCED PRACTICE NURSE CONSULT - ASSESSMENT
Pt reports skin tears to both arms r/t fall. Denies pain.    Right arm: 4 scattered type 2 partial thickness skin tears with clean pink wound beds, no drainage, no signs of infection or hematoma. All wounds less than 2x2cm.   Left upper arm: one type 1 skin tear with skin reapproximated No drainage

## 2024-02-05 NOTE — PHYSICAL THERAPY INITIAL EVALUATION ADULT - SHORT TERM MEMORY, REHAB EVAL
EGD Procedure Note        Procedure Type: Esophagogastroduodenoscopy with biopsy      Post-Endoscopic diagnosis:   1. Ulcerative esophagitis at the GE junction.  2. Mild antritis     Indication for Endoscopy:   Zainab Danielson is a 51 year old female presenting for evaluation of increasingly worsening reflux symptoms off PPI.      Medications: MAC     Sedation Start Time:   Procedure End Time:     Event Tracking     Panel 1     Procedure : ESOPHAGOGASTRODUODENOSCOPY      Event Time In    Procedure Start 0947    Scope In 0947    Cecum Reached     Scope Out 0952    Cecum Surgically Absent     Procedure End 0952       Procedure : COLONOSCOPY      Event Time In    Procedure Start 0947    Scope In     Cecum Reached     Scope Out     Cecum Surgically Absent     Procedure End              Instrument Used: Olympus upper endoscope     PROCEDURE DESCRIPTION:     As part of an informed consent discussion, the risks and benefits of the procedure were explained to patient/POA who demonstrated an understanding and consented to the procedure. Informed consent discussion included discussion of risks of sedation, polypectomy, perforation, bleeding and the risk of missed polyps and cancers. The history was reviewed and the patient was examined. The patient was deemed stable for the procedure and monitored throughout.   The patient was placed in the left lateral decubitus position and a bite block was placed. The esophagus was then intubated under direct visualization without difficulty. The endoscope was passed through esophagus and stomach into the 3rd portion of the duodenum with careful inspection of the bulb. The endoscope was then slowly withdrawn with careful evaluation of the mucosa. Retroflexion was performed in the stomach. All findings and interventions are listed below. Following the procedure, the stomach was decompressed, the endoscope was removed, and the procedure was terminated.     FINDINGS AND INTERVENTIONS:      Duodenum/Duodenal Bulb: Normal   Stomach: Patchy erythema in the antrum with a normal body   Retroflexed view: Normal     Gastroesophageal junction: 36 cm from the incisors  Esophagus: 1 x 1.5 cm ulceration at the GE junction with several linear ulcerations arising up from the Z line consistent with ulcerative esophagitis. Normal mid and proximal esophagus     Mullins’s esophagus: Possible with ulcerations      Biopsy: yes antrum and GE junction area  Complications: None    Estimated blood loss: Less than 5 cc   Condition: Stable   Photographs: Yes     Event Tracking     Panel 1     Procedure : ESOPHAGOGASTRODUODENOSCOPY      Event Time In    Procedure Start 0947    Scope In 0947    Cecum Reached     Scope Out 0952    Cecum Surgically Absent     Procedure End 0952       Procedure : COLONOSCOPY      Event Time In    Procedure Start 0947    Scope In     Cecum Reached     Scope Out     Cecum Surgically Absent     Procedure End              RECOMMENDATIONS:    1. Check pathology. 2. Restart lansoprazole 30 mg daily. 3. Repeat EGD in 3-4 months to assess healing. 4. Please see her colonoscopy report.          cc: Primary/Referring MD Gerard Shelley MD FACG   intact

## 2024-02-05 NOTE — ADVANCED PRACTICE NURSE CONSULT - REASON FOR CONSULT
CRIS skin tear    Per chart review, 79 yo with History of hypertension, hyperlipidemia, osteoporosis, ? renal insufficiency, patient presents with 6 day hx of cough, congestion, nausea decreased P.O intake occasional diarrhea and a fall preceded by lightheadedness on the day of admission. She was found to have bradycardia and KATIE, admitted for further work up of presyncope and treatment of KATIE

## 2024-02-05 NOTE — PROGRESS NOTE ADULT - PROBLEM SELECTOR PLAN 4
On admission creatinine 2.66. Baseline creat 1.1 (Nov 2023) . Patient has self-written report "Updated Dec 2023" that shows GFR 49-50.   FeNa 2.2 intrinsic. Protein/creatine ratio 0.3, no evidence of cast  DDX: Likely prerenal i/s/o decreased P.o intake vs less likely intrinsic (given prior normal creatinine recently) vs less likely obstructive     Plan:   -Fluids prn to maintain MAP>65  -f/u Bladder scan  -F/u retroperitoneal US  -Avoid Nephrotoxic agents On admission creatinine 2.66. Baseline creat 1.1 (Nov 2023) . Patient has self-written report "Updated Dec 2023" that shows GFR 49-50.   FeNa 2.2 intrinsic. Protein/creatine ratio 0.3, no evidence of cast. Bladder scan 192,   DDX: Likely prerenal i/s/o decreased P.o intake vs less likely intrinsic (given prior normal creatinine recently) vs less likely obstructive     Plan:   -Fluids prn to maintain MAP>65  -F/u retroperitoneal US  -Avoid Nephrotoxic agents

## 2024-02-05 NOTE — PROGRESS NOTE ADULT - PROBLEM SELECTOR PLAN 3
Presents with 6 day hx of cough, productive of white sputum, decreased P.O intake, nausea. No sick contacts no recent travel. Does not meet any SIRS criteria  CXR with atelectasis, but no consolidations or effusions  Ct chest: Bilateral pulmonary nodules or nodular opacities measuring up to about 1 cm .   Tested positive for RSV.     Plan:  -Supportive care: Tessalon Pearle, Hycodan cough syrup, duonebs q6, Tylenol PRN for Fever  -Incentive spirometer   -Isolation precautions  -f/u pro-calc, urine strep, legionella Presents with 6 day hx of cough, productive of white sputum, decreased P.O intake, nausea. No sick contacts no recent travel. Does not meet any SIRS criteria  CXR with atelectasis, but no consolidations or effusions. Urine strep/legionella negative procalc 0.38   Ct chest: Bilateral pulmonary nodules or nodular opacities measuring up to about 1 cm .   Tested positive for RSV.     Plan:  -Supportive care: Tessalon Pearle, Hycodan cough syrup, duonebs q6, Tylenol PRN for Fever  -Incentive spirometer   -Isolation precautions  -monitor off abx

## 2024-02-05 NOTE — PROGRESS NOTE ADULT - PROBLEM SELECTOR PLAN 6
On home Rosuvastatin 10mg     Plan:   Start Rosuvastatin 10  check lipid panel On home Rosuvastatin 10mg. LDL 43    Plan:   c/w Rosuvastatin 10

## 2024-02-05 NOTE — DISCHARGE NOTE PROVIDER - HOSPITAL COURSE
77 yo with History of hypertension, hyperlipidemia, osteoporosis, ? renal insufficiency, patient presents with 6 day hx of cough, congestion, nausea decreased P.O intake occasional diarrhea and a fall preceded by lightheadedness on the day of admission. She was found to have bradycardia and KATIE, admitted for further work up of presyncope and treatment of KATIE     # Fall.   #Unwitnessed fall, ?LOC.  On admission patient reports she was standing in the kitchen and felt suddenly lightheaded and  possibly had LOC hitting the ground on her upper back, but did not hit her head. Fall was not witnessed but her  came into the room quickly. She was able to stand and walk unassisted after this. Denies chest pain, palpitations, dyspnea prior to episode. Similar episode occurred only once before 5 years ago. EKG Sinus Bradycardia and on admission noted to have HR 56->58. Trop wnl, recent TTE with normal LV/RV. CT head/neck were negative for acute fracture. Patient endorsing pain on R am . X ray of R arm-no acute fracture TSH- 1.68 wnl . Orthostatics Negative however unclear if s/p fluids. PT consulted recommended Home PT. Tetanus vaccine was provided as patient has superficial lacerations s/p fall on R arm and last reported vaccine was 12 years ago per the patient.   DDX: Orthostatic (i/s/o decreased p.o intake) vs Cardiac (given bradycardia) vs vasovagal  Home meds: Losartan-HCTZ , Atenolol 50 MG, Amodipine 5     Plan:  -c/w Home PT  -Hold Losartan-HCTZ, Atenolol and Amlodipine given bradycardia and normotensive  -f/u with PC P    #Bradycardia.   TTe on Jan 2024: Normal LV systolic and diaastolic function EF 65%. Normal RV, mild mitral and tricuspid regurg.   EKG with sinus bradycardia, QTc 416. On admission noted to have HR 56->58. Trop wnl, recent TTE with normal LV/RV. Currently asymtpomatic     Plan:   -f/u with PCP     # Respiratory syncytial virus (RSV).   ·  Plan: Presents with 6 day hx of cough, productive of white sputum, decreased P.O intake, nausea. No sick contacts no recent travel. Does not meet any SIRS criteria  CXR with atelectasis, but no consolidations or effusions. Urine strep/legionella negative procalc 0.38   Ct chest: Bilateral pulmonary nodules or nodular opacities measuring up to about 1 cm .   Tested positive for RSV. Treated with supportive measures     Plan:  -Supportive care: Tessalon Pearle,  Tylenol PRN for Fever    #KATIE (acute kidney injury).   ·  Plan: On admission creatinine 2.66. Baseline creat 1.1 (Nov 2023) . Patient has self-written report "Updated Dec 2023" that shows GFR 49-50.   FeNa 2.2 intrinsic. Protein/creatine ratio 0.3, no evidence of cast. Bladder scan 192,   DDX: Likely prerenal i/s/o decreased P.o intake vs less likely intrinsic (given prior normal creatinine recently) vs less likely obstructive     Plan:   -Encourage P.O intake     #HTN (hypertension).   ·  Plan: Hc of HTN. Home meds;  Losartan-HCTZ , Atenolol 50 MG, Amodipine 5 MG.    Plan:   Hold Home meds i/s/o hypotension-normotensive on admission. Start gradually if BP elevated with outpatient provider     # HLD (hyperlipidemia).   ·  Plan: On home Rosuvastatin 10mg. LDL 43    Plan:   c/w Rosuvastatin 10.    # Pulmonary nodules.   ·  Plan: On CT on 2/4: Bilateral pulmonary nodules or nodular opacities measuring up to about 1 cm as described above. A 3 month follow-up noncontrast chest CT is recommended for complete evaluation.    Plan:   -f/u outpatient.     #Osteoporosis.   ·  Plan: On Prolia injections q6 moths. Last time was Jan 16 2024    Plan:   -v/w Prolia injections as per schedule

## 2024-02-05 NOTE — PHYSICAL THERAPY INITIAL EVALUATION ADULT - ADDITIONAL COMMENTS
Pt reports not using any assistive devices. Pt lives with her . Pt lives in an apartment with 3 MARY JANE.

## 2024-02-05 NOTE — CONSULT NOTE ADULT - SUBJECTIVE AND OBJECTIVE BOX
Patient is a 78y old  Female who presents with a chief complaint of     HPI:  79 yo with History of hypertension, hyperlipidemia, osteoporosis, ? renal insufficiency, presents after an unwitnessed fall at home today, which was preceded by lightheadedness. Patient reports that for the past 6 days she has been having cough productive of white sputum, nasal congestion, nausea decreased P.O intake, occasional diarrhea. She reports today she was standing in the kitchen and felt suddenly lightheaded and  possibly lost conscinesse hitting the ground on her upper back, but did not hit her head. Fall was not witnessed but her  came into the room quickly. She was able to stand and walk unasstisted after this. Denies chest pain, palpitations, dyspnea prior to episode. Similar episode occurred only once before 5 years ago. She also denies vomiting, abdominal pain, dysuria, urinary frequency, urgency  Patient currently ambulates without assistive devices, lives with her . She has no significant smoking, alcohol or illicit drug use hx     In the ED   VS: T 97.5, HR 56, BP 98/55->117/58 s/p fluids , RR 17, SPO2 98 in RA  Labs: CBC wnl, Na 135, K 3.6, Bicab 22, creat 2.66, , CKMB 3.9, Trop 25 wnl. UA WBC 8, LE Mod, Bacteria neg, Nitrite, RVP positive for RSV   Imaging: CT head: No acute intracranial hemorrhage or calvarial fracture.  CT cervical spine: No acute fracture or malalignment. Multilevel cervical spondylosis.  Ct chest: Bilateral pulmonary nodules or nodular opacities measuring up to about 1   cm as described above.  CXR: There may be some minimal linear atelectasis over the left  lateral costophrenic angle region. The remainder of the lung zones are clear. There is no pneumothorax or large pleural effusions. Soft tissues and osseous structures are intact.  EKG: Sinus Bradycardia   Interventions: NS 1L x2       (04 Feb 2024 18:49)    PAST MEDICAL & SURGICAL HISTORY:  No pertinent past medical history        MEDICATIONS  (STANDING):  albuterol/ipratropium for Nebulization 3 milliLiter(s) Nebulizer every 6 hours  atorvastatin 40 milliGRAM(s) Oral at bedtime  benzonatate 100 milliGRAM(s) Oral every 8 hours  heparin   Injectable 5000 Unit(s) SubCutaneous every 8 hours    MEDICATIONS  (PRN):            FAMILY HISTORY:      CBC Full  -  ( 05 Feb 2024 05:30 )  WBC Count : 6.62 K/uL  RBC Count : 3.54 M/uL  Hemoglobin : 11.2 g/dL  Hematocrit : 33.0 %  Platelet Count - Automated : 142 K/uL  Mean Cell Volume : 93.2 fl  Mean Cell Hemoglobin : 31.6 pg  Mean Cell Hemoglobin Concentration : 33.9 gm/dL  Auto Neutrophil # : 3.95 K/uL  Auto Lymphocyte # : 2.10 K/uL  Auto Monocyte # : 0.40 K/uL  Auto Eosinophil # : 0.14 K/uL  Auto Basophil # : 0.02 K/uL  Auto Neutrophil % : 59.7 %  Auto Lymphocyte % : 31.7 %  Auto Monocyte % : 6.0 %  Auto Eosinophil % : 2.1 %  Auto Basophil % : 0.3 %      02-05    140  |  105  |  38<H>  ----------------------------<  94  3.2<L>   |  24  |  1.89<H>    Ca    8.8      05 Feb 2024 05:30  Phos  4.3     02-05  Mg     2.2     02-05    TPro  5.8<L>  /  Alb  3.4  /  TBili  0.5  /  DBili  x   /  AST  22  /  ALT  13  /  AlkPhos  53  02-05      Urinalysis Basic - ( 05 Feb 2024 05:30 )    Color: x / Appearance: x / SG: x / pH: x  Gluc: 94 mg/dL / Ketone: x  / Bili: x / Urobili: x   Blood: x / Protein: x / Nitrite: x   Leuk Esterase: x / RBC: x / WBC x   Sq Epi: x / Non Sq Epi: x / Bacteria: x        Radiology :     < from: Xray Chest 1 View-PORTABLE IMMEDIATE (Xray Chest 1 View-PORTABLE IMMEDIATE .) (02.04.24 @ 13:07) >  ACC: [id number] EXAM:  XR CHEST PORTABLE IMMED 1V   ORDERED BY: CECILY CLEMENTE     PROCEDURE DATE:  02/04/2024          INTERPRETATION:  XR CHEST IMMEDIATE dated 2/4/2024 1:07 PM    CLINICAL INFORMATION: Female, 78 years old.  weakness.    PRIOR STUDIES: None    FINDINGS/  IMPRESSION: Heart size, mediastinal and hilar contours are within normal   limits. There is left hemidiaphragm elevation the chronicity of which   cannot be determined without the value of prior imaging for direct   comparison. There may be some minimal linear atelectasis over the left   lateral costophrenic angle region. The remainder of the lung zones are   clear. There is no pneumothorax or large pleural effusions. Soft tissues   and osseous structures are intact.    < from: CT Head No Cont (02.04.24 @ 15:15) >    ACC: [id number] EXAM:  CT CERVICAL SPINE   ORDERED BY: CECILY CLEMENTE     ACC: [id number] EXAM:  CT BRAIN   ORDERED BY: CECILY CLEMENTE     PROCEDURE DATE:  02/04/2024          INTERPRETATION:  PROCEDURE: CT HEAD  CT CERVICAL SPINE    TECHNIQUE: CT HEAD: Multiple contiguous axial CT images of the head were   obtained from the base of the skull to the vertex without the   administration of intravenous contrast. Coronal and sagittal reformatted   images were obtained.    CT CERVICAL SPINE: Multiple axial sections were obtained from the mid   orbits to the sternoclavicular joint. Sagittal and coronal reformats were   obtained from the axial data set. The images were reviewed in soft tissue   and bone windows.      Clinical information: Fall    Comparison: None    FINDINGS:    The ventricles and sulci are within normal limits for patient's stated   age.    No acute intracranial hemorrhage, extra-axial fluid collection, mass   effect or midline shift..    Gray-white matter differentiation is preserved. There are scattered foci   of periventricular and subcortical hypodensities consistent with mild   chronic microvascular ischemic disease. .    The bones of the calvarium are intact.    There is right maxillary sinus mucosal thickening. There is mucosal   thickening of the bilateral ethmoid air cells. The mastoid air cells are   well-aerated..    CT Cervical Spine:    The CT exam demonstrates loss of the normal cervical lordosis which may   be secondary to positioning. The vertebral body heights are maintained.   There is multilevel disc space narrowing most prominent at C5-C6. The   prevertebral soft tissues are within normal limits. The osseous   structures are intact without fracture.    At the C2/3 level, there is a disc bulge without significant spinal canal   stenosis or neural foraminal narrowing.    At the C3/4 level, there is posterior osseous ridging without significant   spinal canal stenosis. There is uncovertebral and facet hypertrophy with   severe left and moderate right neural foraminal narrowing.    At the C4/5 level, there is a left paracentral disc protrusion with   posterior osseous ridging without significant spinal canal stenosis.   There is uncovertebral and facet hypertrophy with moderate to severe   bilateral neural foraminal narrowing.    At the C5/6 level, there is posterior osseous ridging without significant   spinal canal stenosis. There is uncovertebral and facet hypertrophy with   severe bilateral neural foraminal narrowing.    At the C6/7 level, there is no disc herniation. There is no central   spinal canal stenosis or neural foramen narrowing.    At the C7/T1 level, there is no disc herniation. There is no central   spinal canal stenosis or neural foramen narrowing.      IMPRESSION:  CThead: No acute intracranial hemorrhage or calvarial fracture.    CT cervical spine: No acute fracture or malalignment. Multilevel cervical   spondylosis.            Review of Systems : per HPI         Vital Signs Last 24 Hrs  T(C): 36.4 (05 Feb 2024 06:27), Max: 36.5 (04 Feb 2024 22:38)  T(F): 97.5 (05 Feb 2024 06:27), Max: 97.7 (04 Feb 2024 22:38)  HR: 55 (05 Feb 2024 06:27) (55 - 60)  BP: 118/59 (05 Feb 2024 06:27) (98/55 - 157/69)  BP(mean): --  RR: 18 (05 Feb 2024 06:27) (17 - 18)  SpO2: 98% (05 Feb 2024 06:27) (98% - 100%)    Parameters below as of 05 Feb 2024 06:27  Patient On (Oxygen Delivery Method): room air            Physical Exam :  CONTACT/RSV isolation ,  78 y o woman lying comfortably in semi Curiel's position , awake , alert , no acute complaints , feels tired     Head : normocephalic , atraumatic    Eyes : PERRLA , EOMI , no nystagmus , sclera anicteric    ENT / FACE : neg nasal discharge , uvula midline , no oropharyngeal erythema / exudate    Neck : supple , negative JVD , negative carotid bruits , no thyromegaly    Chest : bibasilar crackles    Cardiovascular : regular rate and rhythm , neg murmurs / rubs / gallops    Abdomen : soft , non distended , non tender to palpation in all 4 quadrants ,  normal bowel sounds     Extremities : WWP , neg cyanosis /clubbing / edema , RUE ulcerations    Neurologic Exam :     Alert and oriented x 3        Motor Exam:                Right UE:                     no focal weakness ,  > 3+/5 throughout  , no pronator drift     Left UE:                       no focal weakness ,  > 3+/5 throughout  , no pronator drift         Right LE:          no focal weakness ,  > 3+/5 throughout          5/5 : hip flexors      Left LE:          no focal weakness ,  > 3+/5 throughout         Sensation :         intact to light touch x 4 extremities                            no neglect or extinction on double simultaneous testing    DTR :           biceps/brachioradialis : equal                            patella/ankle : equal           neg Babinski         Coordination :            Finger to Nose :  neg dysmetria bilaterally            Gait :  not tested          PM&R Impression : admitted for c/o fall, presyncope and treatment of KATIE , RSV    - no acute pathology on CT brain imaging      - no focal neurologic deficits         Recommendations / Plan :       1) Physical / Occupational therapy focusing on therapeutic exercises , equipment evaluation , bed mobility/transfer out of bed evaluation , progressive ambulation with assistive devices prn .    2) Current disposition plan recommendation  :    pending functional progress

## 2024-02-05 NOTE — PHYSICAL THERAPY INITIAL EVALUATION ADULT - IMPAIRMENTS CONTRIBUTING TO GAIT DEVIATIONS, PT EVAL
Turns quickly while ambulating causes her to have unsteady balance. Pt benefits from RW to safely ambulate. Pt required VC to slow down on turns and pt follows recommendations./impaired balance/decreased strength

## 2024-02-05 NOTE — ADVANCED PRACTICE NURSE CONSULT - RECOMMEDATIONS
BUE: Adaptic touch, nonadherent, Pelon, change every 2-3 days.     Pt educated on wound care at home. Supplies provided.     Please reconsult if wounds deteriorate or new concerns arise. 
solids

## 2024-02-05 NOTE — PROGRESS NOTE ADULT - PROBLEM SELECTOR PLAN 5
Hc of HTN. Home meds;  Losartan-HCTZ , Atenolol 50 MG, Amodipine 5 MG.    Plan:   Hold Home meds i/s/o hypotension-normotensive on admission. Start as indicated Hc of HTN. Home meds;  Losartan-HCTZ , Atenolol 50 MG, Amodipine 5 MG.    Plan:   Hold Home meds i/s/o hypotension-normotensive on admission. Start gradually if BP elevated

## 2024-02-05 NOTE — PROGRESS NOTE ADULT - PROBLEM SELECTOR PLAN 1
Unwitnessed fall, ?LOC.  Patient reports today she was standing in the kitchen and felt suddenly lightheaded and  possibly LOC hitting the ground on her upper back, but did not hit her head. Fall was not witnessed but her  came into the room quickly. She was able to stand and walk unasstisted after this. Denies chest pain, palpitations, dyspnea prior to episode. Similar episode occurred only once before 5 years ago  EKG Sinus Bradycardia and on admission noted to have HR 56->58. Trop wnl, recent TTE with normal LV/RV. CT head/neck were negative for acute fracture. Patient endorsing pain on R am .   Orthostatics Negative however unclear if s/p fluids   DDX: Orthostatic (i/s/o decreased p.o intake) vs Cardiac (given bradycardia) vs vasovagal     Plan:  -X ray of R arm   -check TSH  -Hold home meds; Losartan-HCTZ , Atenolol 50 MG, Amodipine 5   -Consider escalation of care if patient becomes symptomatic or HR<50 Unwitnessed fall, ?LOC.  Patient reports today she was standing in the kitchen and felt suddenly lightheaded and  possibly LOC hitting the ground on her upper back, but did not hit her head. Fall was not witnessed but her  came into the room quickly. She was able to stand and walk unasstisted after this. Denies chest pain, palpitations, dyspnea prior to episode. Similar episode occurred only once before 5 years ago  EKG Sinus Bradycardia and on admission noted to have HR 56->58. Trop wnl, recent TTE with normal LV/RV. CT head/neck were negative for acute fracture. Patient endorsing pain on R am . X ray of R arm-no acute fracture TSH- 1.68 wnl   Orthostatics Negative however unclear if s/p fluids   DDX: Orthostatic (i/s/o decreased p.o intake) vs Cardiac (given bradycardia) vs vasovagal     Plan:  -PT consult  -s/p Tetanus vaccine given superficial ulceration on arm s/p fall, last TdAP 12 years ago per the patient  -wound care consult   -Hold home meds; Losartan-HCTZ , Atenolol 50 MG, Amodipine 5   -Consider escalation of care if patient becomes symptomatic or HR<50

## 2024-02-06 ENCOUNTER — TRANSCRIPTION ENCOUNTER (OUTPATIENT)
Age: 79
End: 2024-02-06

## 2024-02-06 VITALS
SYSTOLIC BLOOD PRESSURE: 127 MMHG | RESPIRATION RATE: 18 BRPM | HEART RATE: 62 BPM | TEMPERATURE: 98 F | OXYGEN SATURATION: 97 % | DIASTOLIC BLOOD PRESSURE: 62 MMHG

## 2024-02-06 LAB
ALBUMIN SERPL ELPH-MCNC: 3.3 G/DL — SIGNIFICANT CHANGE UP (ref 3.3–5)
ALP SERPL-CCNC: 51 U/L — SIGNIFICANT CHANGE UP (ref 40–120)
ALT FLD-CCNC: 9 U/L — LOW (ref 10–45)
ANION GAP SERPL CALC-SCNC: 12 MMOL/L — SIGNIFICANT CHANGE UP (ref 5–17)
AST SERPL-CCNC: 15 U/L — SIGNIFICANT CHANGE UP (ref 10–40)
BASOPHILS # BLD AUTO: 0.03 K/UL — SIGNIFICANT CHANGE UP (ref 0–0.2)
BASOPHILS NFR BLD AUTO: 0.6 % — SIGNIFICANT CHANGE UP (ref 0–2)
BILIRUB SERPL-MCNC: 0.6 MG/DL — SIGNIFICANT CHANGE UP (ref 0.2–1.2)
BUN SERPL-MCNC: 26 MG/DL — HIGH (ref 7–23)
CALCIUM SERPL-MCNC: 8.5 MG/DL — SIGNIFICANT CHANGE UP (ref 8.4–10.5)
CHLORIDE SERPL-SCNC: 106 MMOL/L — SIGNIFICANT CHANGE UP (ref 96–108)
CO2 SERPL-SCNC: 23 MMOL/L — SIGNIFICANT CHANGE UP (ref 22–31)
CORTIS AM PEAK SERPL-MCNC: 12.78 UG/DL — SIGNIFICANT CHANGE UP (ref 6.02–18.4)
CREAT SERPL-MCNC: 1.29 MG/DL — SIGNIFICANT CHANGE UP (ref 0.5–1.3)
CULTURE RESULTS: SIGNIFICANT CHANGE UP
EGFR: 43 ML/MIN/1.73M2 — LOW
EOSINOPHIL # BLD AUTO: 0.07 K/UL — SIGNIFICANT CHANGE UP (ref 0–0.5)
EOSINOPHIL NFR BLD AUTO: 1.4 % — SIGNIFICANT CHANGE UP (ref 0–6)
GLUCOSE SERPL-MCNC: 120 MG/DL — HIGH (ref 70–99)
HCT VFR BLD CALC: 31 % — LOW (ref 34.5–45)
HGB BLD-MCNC: 10.5 G/DL — LOW (ref 11.5–15.5)
IMM GRANULOCYTES NFR BLD AUTO: 0.4 % — SIGNIFICANT CHANGE UP (ref 0–0.9)
LYMPHOCYTES # BLD AUTO: 1.48 K/UL — SIGNIFICANT CHANGE UP (ref 1–3.3)
LYMPHOCYTES # BLD AUTO: 28.8 % — SIGNIFICANT CHANGE UP (ref 13–44)
MAGNESIUM SERPL-MCNC: 1.9 MG/DL — SIGNIFICANT CHANGE UP (ref 1.6–2.6)
MCHC RBC-ENTMCNC: 31.7 PG — SIGNIFICANT CHANGE UP (ref 27–34)
MCHC RBC-ENTMCNC: 33.9 GM/DL — SIGNIFICANT CHANGE UP (ref 32–36)
MCV RBC AUTO: 93.7 FL — SIGNIFICANT CHANGE UP (ref 80–100)
MONOCYTES # BLD AUTO: 0.22 K/UL — SIGNIFICANT CHANGE UP (ref 0–0.9)
MONOCYTES NFR BLD AUTO: 4.3 % — SIGNIFICANT CHANGE UP (ref 2–14)
NEUTROPHILS # BLD AUTO: 3.31 K/UL — SIGNIFICANT CHANGE UP (ref 1.8–7.4)
NEUTROPHILS NFR BLD AUTO: 64.5 % — SIGNIFICANT CHANGE UP (ref 43–77)
NRBC # BLD: 0 /100 WBCS — SIGNIFICANT CHANGE UP (ref 0–0)
PHOSPHATE SERPL-MCNC: 2.9 MG/DL — SIGNIFICANT CHANGE UP (ref 2.5–4.5)
PLATELET # BLD AUTO: 142 K/UL — LOW (ref 150–400)
POTASSIUM SERPL-MCNC: 4 MMOL/L — SIGNIFICANT CHANGE UP (ref 3.5–5.3)
POTASSIUM SERPL-SCNC: 4 MMOL/L — SIGNIFICANT CHANGE UP (ref 3.5–5.3)
PROT SERPL-MCNC: 5.8 G/DL — LOW (ref 6–8.3)
RBC # BLD: 3.31 M/UL — LOW (ref 3.8–5.2)
RBC # FLD: 13.2 % — SIGNIFICANT CHANGE UP (ref 10.3–14.5)
SODIUM SERPL-SCNC: 141 MMOL/L — SIGNIFICANT CHANGE UP (ref 135–145)
SPECIMEN SOURCE: SIGNIFICANT CHANGE UP
WBC # BLD: 5.13 K/UL — SIGNIFICANT CHANGE UP (ref 3.8–10.5)
WBC # FLD AUTO: 5.13 K/UL — SIGNIFICANT CHANGE UP (ref 3.8–10.5)

## 2024-02-06 PROCEDURE — 80048 BASIC METABOLIC PNL TOTAL CA: CPT

## 2024-02-06 PROCEDURE — 85025 COMPLETE CBC W/AUTO DIFF WBC: CPT

## 2024-02-06 PROCEDURE — G0378: CPT

## 2024-02-06 PROCEDURE — 94640 AIRWAY INHALATION TREATMENT: CPT

## 2024-02-06 PROCEDURE — 99285 EMERGENCY DEPT VISIT HI MDM: CPT | Mod: 25

## 2024-02-06 PROCEDURE — 87086 URINE CULTURE/COLONY COUNT: CPT

## 2024-02-06 PROCEDURE — 85610 PROTHROMBIN TIME: CPT

## 2024-02-06 PROCEDURE — 86803 HEPATITIS C AB TEST: CPT

## 2024-02-06 PROCEDURE — 0225U NFCT DS DNA&RNA 21 SARSCOV2: CPT

## 2024-02-06 PROCEDURE — 84540 ASSAY OF URINE/UREA-N: CPT

## 2024-02-06 PROCEDURE — 84156 ASSAY OF PROTEIN URINE: CPT

## 2024-02-06 PROCEDURE — 84443 ASSAY THYROID STIM HORMONE: CPT

## 2024-02-06 PROCEDURE — 80053 COMPREHEN METABOLIC PANEL: CPT

## 2024-02-06 PROCEDURE — 90715 TDAP VACCINE 7 YRS/> IM: CPT

## 2024-02-06 PROCEDURE — 72125 CT NECK SPINE W/O DYE: CPT | Mod: MA

## 2024-02-06 PROCEDURE — 71250 CT THORAX DX C-: CPT | Mod: MA

## 2024-02-06 PROCEDURE — 82570 ASSAY OF URINE CREATININE: CPT

## 2024-02-06 PROCEDURE — 84300 ASSAY OF URINE SODIUM: CPT

## 2024-02-06 PROCEDURE — 99232 SBSQ HOSP IP/OBS MODERATE 35: CPT | Mod: GC

## 2024-02-06 PROCEDURE — 84145 PROCALCITONIN (PCT): CPT

## 2024-02-06 PROCEDURE — 70450 CT HEAD/BRAIN W/O DYE: CPT | Mod: MA

## 2024-02-06 PROCEDURE — 85730 THROMBOPLASTIN TIME PARTIAL: CPT

## 2024-02-06 PROCEDURE — 71045 X-RAY EXAM CHEST 1 VIEW: CPT

## 2024-02-06 PROCEDURE — 82553 CREATINE MB FRACTION: CPT

## 2024-02-06 PROCEDURE — 83935 ASSAY OF URINE OSMOLALITY: CPT

## 2024-02-06 PROCEDURE — 83735 ASSAY OF MAGNESIUM: CPT

## 2024-02-06 PROCEDURE — 82962 GLUCOSE BLOOD TEST: CPT

## 2024-02-06 PROCEDURE — 82550 ASSAY OF CK (CPK): CPT

## 2024-02-06 PROCEDURE — 84133 ASSAY OF URINE POTASSIUM: CPT

## 2024-02-06 PROCEDURE — 87899 AGENT NOS ASSAY W/OPTIC: CPT

## 2024-02-06 PROCEDURE — 84100 ASSAY OF PHOSPHORUS: CPT

## 2024-02-06 PROCEDURE — 36415 COLL VENOUS BLD VENIPUNCTURE: CPT

## 2024-02-06 PROCEDURE — 84484 ASSAY OF TROPONIN QUANT: CPT

## 2024-02-06 PROCEDURE — 81001 URINALYSIS AUTO W/SCOPE: CPT

## 2024-02-06 PROCEDURE — 82533 TOTAL CORTISOL: CPT

## 2024-02-06 PROCEDURE — 87040 BLOOD CULTURE FOR BACTERIA: CPT

## 2024-02-06 PROCEDURE — 73090 X-RAY EXAM OF FOREARM: CPT

## 2024-02-06 PROCEDURE — 93005 ELECTROCARDIOGRAM TRACING: CPT

## 2024-02-06 PROCEDURE — 97161 PT EVAL LOW COMPLEX 20 MIN: CPT

## 2024-02-06 PROCEDURE — 87449 NOS EACH ORGANISM AG IA: CPT

## 2024-02-06 PROCEDURE — 80061 LIPID PANEL: CPT

## 2024-02-06 RX ORDER — AMLODIPINE BESYLATE 2.5 MG/1
1 TABLET ORAL
Refills: 0 | DISCHARGE

## 2024-02-06 RX ORDER — MAGNESIUM OXIDE 400 MG ORAL TABLET 241.3 MG
1 TABLET ORAL
Refills: 0 | DISCHARGE

## 2024-02-06 RX ORDER — ATENOLOL 25 MG/1
1 TABLET ORAL
Refills: 0 | DISCHARGE

## 2024-02-06 RX ORDER — MULTIVIT-MIN/FERROUS GLUCONATE 9 MG/15 ML
1 LIQUID (ML) ORAL
Qty: 0 | Refills: 0 | DISCHARGE

## 2024-02-06 RX ORDER — LOSARTAN/HYDROCHLOROTHIAZIDE 100MG-25MG
1 TABLET ORAL
Refills: 0 | DISCHARGE

## 2024-02-06 RX ORDER — ROSUVASTATIN CALCIUM 5 MG/1
1 TABLET ORAL
Refills: 0 | DISCHARGE

## 2024-02-06 RX ORDER — DENOSUMAB 60 MG/ML
60 INJECTION SUBCUTANEOUS
Refills: 0 | DISCHARGE

## 2024-02-06 RX ADMIN — Medication 3 MILLILITER(S): at 03:01

## 2024-02-06 RX ADMIN — HEPARIN SODIUM 5000 UNIT(S): 5000 INJECTION INTRAVENOUS; SUBCUTANEOUS at 06:20

## 2024-02-06 RX ADMIN — Medication 3 MILLILITER(S): at 10:13

## 2024-02-06 RX ADMIN — Medication 100 MILLIGRAM(S): at 06:20

## 2024-02-06 NOTE — PROGRESS NOTE ADULT - PROBLEM SELECTOR PLAN 7
On CT on 2/4: Bilateral pulmonary nodules or nodular opacities measuring up to about 1 cm as described above. A 3 month follow-up noncontrast chest CT is recommended for complete evaluation.    Plan:   -f/u outpatient
On CT on 2/4: Bilateral pulmonary nodules or nodular opacities measuring up to about 1 cm as described above. A 3 month follow-up noncontrast chest CT is recommended for complete evaluation.    Plan:   -f/u outpatient

## 2024-02-06 NOTE — PROGRESS NOTE ADULT - ASSESSMENT
I M    78 y o with History of hypertension, hyperlipidemia, osteoporosis, ? renal insufficiency, patient presents with 6 day hx of cough, congestion, nausea decreased P.O intake occasional diarrhea and a fall preceded by lightheadedness on the day of admission. She was found to have bradycardia and KATIE, admitted for further work up of presyncope and treatment of KATIE     Problem/Plan - 1:  ·  Problem: Fall.   ·  Plan: Unwitnessed fall, ?LOC.  Patient reports today she was standing in the kitchen and felt suddenly lightheaded and  possibly LOC hitting the ground on her upper back, but did not hit her head. Fall was not witnessed but her  came into the room quickly. She was able to stand and walk unasstisted after this. Denies chest pain, palpitations, dyspnea prior to episode. Similar episode occurred only once before 5 years ago  EKG Sinus Bradycardia and on admission noted to have HR 56->58. Trop wnl, recent TTE with normal LV/RV. CT head/neck were negative for acute fracture. Patient endorsing pain on R am . X ray of R arm-no acute fracture TSH- 1.68 wnl   Orthostatics Negative however unclear if s/p fluids   DDX: Orthostatic (i/s/o decreased p.o intake) vs Cardiac (given bradycardia) vs vasovagal     Plan:  -PT consult  -s/p Tetanus vaccine given superficial ulceration on arm s/p fall, last TdAP 12 years ago per the patient  -wound care consult   -Hold home meds; Losartan-HCTZ , Atenolol 50 MG, Amodipine 5   -Consider escalation of care if patient becomes symptomatic or HR<50.    Problem/Plan - 2:  ·  Problem: Bradycardia.   ·  Plan: TTe on Jan 2024: Normal LV systolic and diaastolic function EF 65%. Normal RV, mild mitral and tricuspid regurg.   EKG with sinus bradycardia, QTc 416. On admission noted to have HR 56->58. Trop wnl, recent TTE with normal LV/RV. Currently asymtpomatic     Plan:   -As above  -Maintain Mg>2, K>4.    Problem/Plan - 3:  ·  Problem: Respiratory syncytial virus (RSV).   ·  Plan: Presents with 6 day hx of cough, productive of white sputum, decreased P.O intake, nausea. No sick contacts no recent travel. Does not meet any SIRS criteria  CXR with atelectasis, but no consolidations or effusions. Urine strep/legionella negative procalc 0.38   Ct chest: Bilateral pulmonary nodules or nodular opacities measuring up to about 1 cm .   Tested positive for RSV.     Plan:  -Supportive care: Tessalon Pearle, Hycodan cough syrup, duonebs q6, Tylenol PRN for Fever  -Incentive spirometer   -Isolation precautions  -monitor off abx.    Problem/Plan - 4:  ·  Problem: KATIE (acute kidney injury).   ·  Plan: On admission creatinine 2.66. Baseline creat 1.1 (Nov 2023) . Patient has self-written report "Updated Dec 2023" that shows GFR 49-50.   FeNa 2.2 intrinsic. Protein/creatine ratio 0.3, no evidence of cast. Bladder scan 192,   DDX: Likely prerenal i/s/o decreased P.o intake vs less likely intrinsic (given prior normal creatinine recently) vs less likely obstructive     Plan:   -Fluids prn to maintain MAP>65  -F/u retroperitoneal US  -Avoid Nephrotoxic agents.    Problem/Plan - 5:  ·  Problem: HTN (hypertension).   ·  Plan: Hc of HTN. Home meds;  Losartan-HCTZ , Atenolol 50 MG, Amodipine 5 MG.    Plan:   Hold Home meds i/s/o hypotension-normotensive on admission. Start gradually if BP elevated.    Problem/Plan - 6:  ·  Problem: HLD (hyperlipidemia).   ·  Plan: On home Rosuvastatin 10mg. LDL 43    Plan:   c/w Rosuvastatin 10.    Problem/Plan - 7:  ·  Problem: Pulmonary nodules.   ·  Plan: On CT on 2/4: Bilateral pulmonary nodules or nodular opacities measuring up to about 1 cm as described above. A 3 month follow-up noncontrast chest CT is recommended for complete evaluation.    Plan:   -f/u outpatient.    Problem/Plan - 8:  ·  Problem: Osteoporosis.   ·  Plan: On Prolia injections q6 moths. Last time was Jan 16 2024    Plan:   -no acute intervention inpatient.    Problem/Plan - 9:  ·  Problem: Prophylactic measure.   ·  Plan: F: s/p 2L NS   DVT ppx: Lovenox  Replete: K, Mg, Phosph PRN  DIet: DASH  Dispo: [facility]F.  
77 yo with History of hypertension, hyperlipidemia, osteoporosis, ? renal insufficiency, patient presents with 6 day hx of cough, congestion, nausea decreased P.O intake occasional diarrhea and a fall preceded by lightheadedness on the day of admission. She was found to have bradycardia and KATIE, admitted for further work up of presyncope and treatment of KATIE 
79 yo with History of hypertension, hyperlipidemia, osteoporosis, ? renal insufficiency, patient presents with 6 day hx of cough, congestion, nausea decreased P.O intake occasional diarrhea and a fall preceded by lightheadedness on the day of admission. She was found to have bradycardia and KATIE, admitted for further work up of presyncope and treatment of KATIE

## 2024-02-06 NOTE — PROGRESS NOTE ADULT - SUBJECTIVE AND OBJECTIVE BOX
**INCOMPLETE NOTE    OVERNIGHT EVENTS:    SUBJECTIVE:  Patient seen and examined at bedside.    Vital Signs Last 12 Hrs  T(F): 97.7 (02-06-24 @ 05:59), Max: 97.7 (02-06-24 @ 05:59)  HR: 59 (02-06-24 @ 05:59) (59 - 68)  BP: 111/67 (02-06-24 @ 05:59) (90/52 - 111/67)  BP(mean): 64 (02-05-24 @ 21:04) (64 - 64)  RR: 18 (02-06-24 @ 05:59) (18 - 18)  SpO2: 98% (02-06-24 @ 05:59) (97% - 98%)  I&O's Summary    04 Feb 2024 07:01  -  05 Feb 2024 07:00  --------------------------------------------------------  IN: 0 mL / OUT: 600 mL / NET: -600 mL    05 Feb 2024 07:01  -  06 Feb 2024 06:59  --------------------------------------------------------  IN: 0 mL / OUT: 1300 mL / NET: -1300 mL        PHYSICAL EXAM:  Constitutional: NAD, comfortable in bed.  HEENT: NC/AT, PERRLA, EOMI, no conjunctival pallor or scleral icterus, MMM  Neck: Supple, no JVD  Respiratory: CTA B/L. No w/r/r.   Cardiovascular: RRR, normal S1 and S2, no m/r/g.   Gastrointestinal: +BS, soft NTND, no guarding or rebound tenderness, no palpable masses   Extremities: wwp; no cyanosis, clubbing or edema.   Vascular: Pulses equal and strong throughout.   Neurological: AAOx3, no CN deficits, strength and sensation intact throughout.   Skin: No gross skin abnormalities or rashes        LABS:                        11.2   6.62  )-----------( 142      ( 05 Feb 2024 05:30 )             33.0     02-05    141  |  105  |  36<H>  ----------------------------<  117<H>  4.0   |  25  |  1.62<H>    Ca    8.5      05 Feb 2024 14:33  Phos  4.3     02-05  Mg     2.2     02-05    TPro  5.8<L>  /  Alb  3.4  /  TBili  0.5  /  DBili  x   /  AST  22  /  ALT  13  /  AlkPhos  53  02-05    PT/INR - ( 04 Feb 2024 12:34 )   PT: 11.9 sec;   INR: 1.05          PTT - ( 04 Feb 2024 12:34 )  PTT:26.2 sec  Urinalysis Basic - ( 05 Feb 2024 14:33 )    Color: x / Appearance: x / SG: x / pH: x  Gluc: 117 mg/dL / Ketone: x  / Bili: x / Urobili: x   Blood: x / Protein: x / Nitrite: x   Leuk Esterase: x / RBC: x / WBC x   Sq Epi: x / Non Sq Epi: x / Bacteria: x          RADIOLOGY & ADDITIONAL TESTS:    MEDICATIONS  (STANDING):  albuterol/ipratropium for Nebulization 3 milliLiter(s) Nebulizer every 6 hours  atorvastatin 40 milliGRAM(s) Oral at bedtime  benzonatate 100 milliGRAM(s) Oral every 8 hours  heparin   Injectable 5000 Unit(s) SubCutaneous every 8 hours    MEDICATIONS  (PRN):   OVERNIGHT EVENTS: PETER    SUBJECTIVE:  Patient seen and examined at bedside. She reports she feels well today and the cough has improved significantly. Denies fever, chills, dyspnea, dizziness, changes to vision, n/v/d, dysuria, diarrhea     Vital Signs Last 12 Hrs  T(F): 97.7 (02-06-24 @ 05:59), Max: 97.7 (02-06-24 @ 05:59)  HR: 59 (02-06-24 @ 05:59) (59 - 68)  BP: 111/67 (02-06-24 @ 05:59) (90/52 - 111/67)  BP(mean): 64 (02-05-24 @ 21:04) (64 - 64)  RR: 18 (02-06-24 @ 05:59) (18 - 18)  SpO2: 98% (02-06-24 @ 05:59) (97% - 98%)  I&O's Summary    04 Feb 2024 07:01  -  05 Feb 2024 07:00  --------------------------------------------------------  IN: 0 mL / OUT: 600 mL / NET: -600 mL    05 Feb 2024 07:01  -  06 Feb 2024 06:59  --------------------------------------------------------  IN: 0 mL / OUT: 1300 mL / NET: -1300 mL        PHYSICAL EXAM:    Constitutional: Resting comfortably in bed; NAD  Head: NC/AT  Eyes: NAI, EOMI, clear conjunctiva  ENT: no nasal discharge; uvula midline, no oropharyngeal erythema or exudates; MM are dry  Neck: supple; no JVD   Respiratory: Fine crackles at the bases otherwise CTA B/L; no W/R/R, no retractions  Cardiac: +S1/S2; RRR; no M/R/G; Bradycardic  Gastrointestinal: soft, NT/ND; no rebound or guarding; +BSx4  Extremities: UE cool to palpation, RUE with superficial ulcerations bu no signs of active discharge or bleeding, wrapped in gauze. No clubbing or cyanosis; no peripheral edema   Vascular: 2+ radial, DP   Dermatologic: skin warm, dry and intact; no rashes, wounds, or scars except as noted above on RUE   Neurologic: AAOx3; follows command, strength is 4/5 in UE and LE [place]   Psychiatric: affect and characteristics of appearance, verbalizations, behaviors are appropriate        LABS:                        11.2   6.62  )-----------( 142      ( 05 Feb 2024 05:30 )             33.0     02-05    141  |  105  |  36<H>  ----------------------------<  117<H>  4.0   |  25  |  1.62<H>    Ca    8.5      05 Feb 2024 14:33  Phos  4.3     02-05  Mg     2.2     02-05    TPro  5.8<L>  /  Alb  3.4  /  TBili  0.5  /  DBili  x   /  AST  22  /  ALT  13  /  AlkPhos  53  02-05    PT/INR - ( 04 Feb 2024 12:34 )   PT: 11.9 sec;   INR: 1.05          PTT - ( 04 Feb 2024 12:34 )  PTT:26.2 sec  Urinalysis Basic - ( 05 Feb 2024 14:33 )    Color: x / Appearance: x / SG: x / pH: x  Gluc: 117 mg/dL / Ketone: x  / Bili: x / Urobili: x   Blood: x / Protein: x / Nitrite: x   Leuk Esterase: x / RBC: x / WBC x   Sq Epi: x / Non Sq Epi: x / Bacteria: x          RADIOLOGY & ADDITIONAL TESTS:    MEDICATIONS  (STANDING):  albuterol/ipratropium for Nebulization 3 milliLiter(s) Nebulizer every 6 hours  atorvastatin 40 milliGRAM(s) Oral at bedtime  benzonatate 100 milliGRAM(s) Oral every 8 hours  heparin   Injectable 5000 Unit(s) SubCutaneous every 8 hours    MEDICATIONS  (PRN):

## 2024-02-06 NOTE — DISCHARGE NOTE NURSING/CASE MANAGEMENT/SOCIAL WORK - PATIENT PORTAL LINK FT
You can access the FollowMyHealth Patient Portal offered by [facility] by registering at the following website: [email or web address]. By joining [facility]’s FollowMyHealth portal, you will also be able to view your health information using other applications (apps) compatible with our system.

## 2024-02-06 NOTE — PROGRESS NOTE ADULT - PROBLEM SELECTOR PLAN 9
F: s/p 2L NS   DVT ppx: Lovenox  Replete: K, Mg, Phosph PRN  DIet: DASH  Dispo: RMF
F: s/p 2L NS   DVT ppx: Lovenox  Replete: K, Mg, Phosph PRN  DIet: DASH  Dispo: RMF

## 2024-02-06 NOTE — PROGRESS NOTE ADULT - PROBLEM SELECTOR PLAN 4
On admission creatinine 2.66. Baseline creat 1.1 (Nov 2023) . Patient has self-written report "Updated Dec 2023" that shows GFR 49-50.   FeNa 2.2 intrinsic. Protein/creatine ratio 0.3, no evidence of cast. Bladder scan 192,   DDX: Likely prerenal i/s/o decreased P.o intake vs less likely intrinsic (given prior normal creatinine recently) vs less likely obstructive     Plan:   -Fluids prn to maintain MAP>65  -F/u retroperitoneal US  -Avoid Nephrotoxic agents

## 2024-02-06 NOTE — DISCHARGE NOTE NURSING/CASE MANAGEMENT/SOCIAL WORK - NSDCPEFALRISK_GEN_ALL_CORE
For information on Fall & Injury Prevention, visit: [email or web address] OR  [email or web address] OR  https://www.cdc.gov/steadi/patient.html

## 2024-02-06 NOTE — PROGRESS NOTE ADULT - TIME-BASED BILLING (NON-CRITICAL CARE)
Time-based billing (NON-critical care) Rhombic Flap Text: The defect edges were debeveled with a #15 scalpel blade. Given the location of the defect and the proximity to free margins a rhombic flap was deemed most appropriate. Using a sterile surgical marker, an appropriate rhombic flap was drawn incorporating the defect. The area thus outlined was incised deep to adipose tissue with a #15 scalpel blade. The skin margins were undermined to an appropriate distance in all directions utilizing iris scissors. Following this, the designed flap was carried over into the primary defect and sutured into place.

## 2024-02-06 NOTE — PROGRESS NOTE ADULT - PROBLEM SELECTOR PLAN 8
On Prolia injections q6 moths. Last time was Jan 16 2024    Plan:   -no acute intervention inpatient
On Prolia injections q6 moths. Last time was Jan 16 2024    Plan:   -no acute intervention inpatient

## 2024-02-06 NOTE — PROGRESS NOTE ADULT - PROBLEM SELECTOR PLAN 5
Hc of HTN. Home meds;  Losartan-HCTZ , Atenolol 50 MG, Amodipine 5 MG.    Plan:   Hold Home meds i/s/o hypotension-normotensive on admission. Start gradually if BP elevated

## 2024-02-06 NOTE — PROGRESS NOTE ADULT - PROBLEM SELECTOR PLAN 2
TTe on Jan 2024: Normal LV systolic and diaastolic function EF 65%. Normal RV, mild mitral and tricuspid regurg.   EKG with sinus bradycardia, QTc 416. On admission noted to have HR 56->58. Trop wnl, recent TTE with normal LV/RV. Currently asymtpomatic     Plan:   -As above  -Maintain Mg>2, K>4
TTe on Jan 2024: Normal LV systolic and diaastolic function EF 65%. Normal RV, mild mitral and tricuspid regurg.   EKG with sinus bradycardia, QTc 416. On admission noted to have HR 56->58. Trop wnl, recent TTE with normal LV/RV. Currently asymtpomatic     Plan:   -As above  -Maintain Mg>2, K>4

## 2024-02-06 NOTE — PROGRESS NOTE ADULT - SUBJECTIVE AND OBJECTIVE BOX
INTERVAL HPI/OVERNIGHT EVENTS:  Events noted; Awake and alert;  BP issues noted;  Still believe she is behind in fluids   Will allow discharge today after evaluation by physical therapy   NO BP meds for now; Will see in office       MEDICATIONS  (STANDING):  albuterol/ipratropium for Nebulization 3 milliLiter(s) Nebulizer every 6 hours  atorvastatin 40 milliGRAM(s) Oral at bedtime  benzonatate 100 milliGRAM(s) Oral every 8 hours  heparin   Injectable 5000 Unit(s) SubCutaneous every 8 hours    MEDICATIONS  (PRN):      Allergies    Cleocin HCl (Hives)  erythromycin (Stomach Upset)  Vicodin (Vomiting)  Demerol (Other)  [place] (Diarrhea)  trimethoprim ophthalmic (Pruritus)  cephalexin (Vomiting)  quinine (Other)  lactose (Stomach Upset; Diarrhea)  amoxicillin (Hives)  Anaprox (Stomach Upset)  Multi Vitamin+ (Diarrhea)  Fosamax (Stomach Upset)  vitamin E (Rash)  Avelox (Diarrhea)    Intolerances        Vital Signs Last 24 Hrs  T(C): 36.5 (06 Feb 2024 05:59), Max: 36.5 (06 Feb 2024 05:59)  T(F): 97.7 (06 Feb 2024 05:59), Max: 97.7 (06 Feb 2024 05:59)  HR: 59 (06 Feb 2024 05:59) (59 - 68)  BP: 111/67 (06 Feb 2024 05:59) (90/52 - 111/67)  BP(mean): 64 (05 Feb 2024 21:04) (64 - 64)  RR: 18 (06 Feb 2024 05:59) (18 - 18)  SpO2: 98% (06 Feb 2024 05:59) (97% - 98%)    Parameters below as of 06 Feb 2024 05:59  Patient On (Oxygen Delivery Method): room air              Constitutional: Awake     Eyes: NAI    ENMT: Negative    Neck: Supple    Back:  no tenderness     Respiratory:  clear     Cardiovascular: S1 S2    Gastrointestinal: soft     Genitourinary:    Extremities:  no edema     Vascular:    Neurological:    Skin:    Lymph Nodes:            02-05 @ 07:01  -  02-06 @ 07:00  --------------------------------------------------------  IN: 0 mL / OUT: 1300 mL / NET: -1300 mL      LABS:                        10.5   5.13  )-----------( 142      ( 06 Feb 2024 10:00 )             31.0     02-05    141  |  105  |  36<H>  ----------------------------<  117<H>  4.0   |  25  |  1.62<H>    Ca    8.5      05 Feb 2024 14:33  Phos  4.3     02-05  Mg     2.2     02-05    TPro  5.8<L>  /  Alb  3.4  /  TBili  0.5  /  DBili  x   /  AST  22  /  ALT  13  /  AlkPhos  53  02-05    PT/INR - ( 04 Feb 2024 12:34 )   PT: 11.9 sec;   INR: 1.05          PTT - ( 04 Feb 2024 12:34 )  PTT:26.2 sec  Urinalysis Basic - ( 05 Feb 2024 14:33 )    Color: x / Appearance: x / SG: x / pH: x  Gluc: 117 mg/dL / Ketone: x  / Bili: x / Urobili: x   Blood: x / Protein: x / Nitrite: x   Leuk Esterase: x / RBC: x / WBC x   Sq Epi: x / Non Sq Epi: x / Bacteria: x        RADIOLOGY & ADDITIONAL TESTS:

## 2024-02-06 NOTE — PROGRESS NOTE ADULT - SUBJECTIVE AND OBJECTIVE BOX
Physical Medicine and Rehabilitation Progress Note :       Patient is a 78y old  Female who presents with a chief complaint of     HPI:  77 yo with History of hypertension, hyperlipidemia, osteoporosis, ? renal insufficiency, presents after an unwitnessed fall at home today, which was preceded by lightheadedness. Patient reports that for the past 6 days she has been having cough productive of white sputum, nasal congestion, nausea decreased P.O intake, occasional diarrhea. She reports today she was standing in the kitchen and felt suddenly lightheaded and  possibly lost conscinesse hitting the ground on her upper back, but did not hit her head. Fall was not witnessed but her  came into the room quickly. She was able to stand and walk unasstisted after this. Denies chest pain, palpitations, dyspnea prior to episode. Similar episode occurred only once before 5 years ago. She also denies vomiting, abdominal pain, dysuria, urinary frequency, urgency  Patient currently ambulates without assistive devices, lives with her . She has no significant smoking, alcohol or illicit drug use hx     In the ED   VS: T 97.5, HR 56, BP 98/55->117/58 s/p fluids , RR 17, SPO2 98 in RA  Labs: CBC wnl, Na 135, K 3.6, Bicab 22, creat 2.66, , CKMB 3.9, Trop 25 wnl. UA WBC 8, LE Mod, Bacteria neg, Nitrite, RVP positive for RSV   Imaging: CT head: No acute intracranial hemorrhage or calvarial fracture.  CT cervical spine: No acute fracture or malalignment. Multilevel cervical spondylosis.  Ct chest: Bilateral pulmonary nodules or nodular opacities measuring up to about 1   cm as described above.  CXR: There may be some minimal linear atelectasis over the left  lateral costophrenic angle region. The remainder of the lung zones are clear. There is no pneumothorax or large pleural effusions. Soft tissues and osseous structures are intact.  EKG: Sinus Bradycardia   Interventions: NS 1L x2       (04 Feb 2024 18:49)                            10.5   5.13  )-----------( 142      ( 06 Feb 2024 10:00 )             31.0       02-06    141  |  106  |  26<H>  ----------------------------<  120<H>  4.0   |  23  |  1.29    Ca    8.5      06 Feb 2024 10:00  Phos  2.9     02-06  Mg     1.9     02-06    TPro  5.8<L>  /  Alb  3.3  /  TBili  0.6  /  DBili  x   /  AST  15  /  ALT  9<L>  /  AlkPhos  51  02-06    Vital Signs Last 24 Hrs  T(C): 36.5 (06 Feb 2024 05:59), Max: 36.5 (06 Feb 2024 05:59)  T(F): 97.7 (06 Feb 2024 05:59), Max: 97.7 (06 Feb 2024 05:59)  HR: 59 (06 Feb 2024 05:59) (59 - 68)  BP: 111/67 (06 Feb 2024 05:59) (90/52 - 111/67)  BP(mean): 64 (05 Feb 2024 21:04) (64 - 64)  RR: 18 (06 Feb 2024 05:59) (18 - 18)  SpO2: 98% (06 Feb 2024 05:59) (97% - 98%)    Parameters below as of 06 Feb 2024 05:59  Patient On (Oxygen Delivery Method): room air        MEDICATIONS  (STANDING):  albuterol/ipratropium for Nebulization 3 milliLiter(s) Nebulizer every 6 hours  atorvastatin 40 milliGRAM(s) Oral at bedtime  benzonatate 100 milliGRAM(s) Oral every 8 hours  heparin   Injectable 5000 Unit(s) SubCutaneous every 8 hours    MEDICATIONS  (PRN):          Initial Functional Status Assessment :       Previous Level of Function:     · Ambulation Skills	independent  · Transfer Skills	independent  · ADL Skills	independent  · Work/Leisure Activity	independent  · Additional Comments	Pt reports not using any assistive devices. Pt lives with her . Pt lives in an apartment with 3 MARY JANE.    Cognitive Status Examination:   · Orientation	oriented to person, place, time and situation  · Level of Consciousness	alert  · Follows Commands and Answers Questions	100% of the time  · Personal Safety and Judgment	intact  · Short Term Memory	intact  · Long Term Memory	intact    Range of Motion Exam:   · Range of Motion Examination	Grossly UE/LE: WFL    Manual Muscle Testing:   · Manual Muscle Testing Results	Grossly assessed with functional movement, bilateral UE/LE greater than or equal to 3+/5    Bed Mobility: Rolling/Turning:     · Level of Clarence	independent    Bed Mobility: Scooting/Bridging:     · Level of Clarence	contact guard  · Physical Assist/Nonphysical Assist	verbal cues; 1 person assist    Bed Mobility: Sit to Supine:     · Level of Clarence	contact guard  · Physical Assist/Nonphysical Assist	verbal cues; 1 person assist    Bed Mobility: Supine to Sit:     · Level of Clarence	contact guard  · Physical Assist/Nonphysical Assist	verbal cues; 1 person assist    Bed Mobility Analysis:     · Bed Mobility Limitations	decreased ability to use arms for pushing/pulling  · Impairments Contributing to Impaired Bed Mobility	decreased strength    Transfer: Sit to Stand:     · Level of Clarence	contact guard  · Physical Assist/Nonphysical Assist	verbal cues; 1 person assist    Transfer: Stand to Sit:     · Level of Clarence	contact guard  · Physical Assist/Nonphysical Assist	verbal cues; 1 person assist    Sit/Stand Transfer Safety Analysis:     · Transfer Safety Concerns Noted	decreased safety awareness; losing balance  · Impairments Contributing to Impaired Transfers	decreased strength; often moves quickly    Gait Skills:     · Level of Clarence	Tadeo x1 no AD. supervision with RW  · Physical Assist/Nonphysical Assist	verbal cues; 1 person assist  · Gait Distance	100 ft with no AD, 20 ft with RW    Gait Analysis:     · Gait Pattern Used	2-point gait  · Gait Deviations Noted	decreased step length; decreased stride length; decreased weight-shifting ability  · Impairments Contributing to Gait Deviations	impaired balance; decreased strength; Turns quickly while ambulating causes her to have unsteady balance. Pt benefits from RW to safely ambulate. Pt required VC to slow down on turns and pt follows recommendations.    Stair Negotiation:     · Level of Clarence	minimum assist (75% patients effort)  · Physical Assist/Nonphysical Assist	verbal cues; 1 person assist  · Assistive Device	right rail up; right rail down  · Number of Stairs	3    Balance Skills Assessment:     · Sitting Balance: Static	fair balance  · Sitting Balance: Dynamic	fair minus  · Sit-to-Stand Balance	fair minus  · Standing Balance: Static	fair balance  · Standing Balance: Dynamic	fair minus  · Identified Impairments Contributing to Balance Disturbance	decreased strength; Patient often moves quickly and causes her to weightshift too much on one side and causes unsteadiness while standing or ambulating; impaired postural control    Clinical Impressions:   · Criteria for Skilled Therapeutic Interventions	impairments found; functional limitations in following categories; risk reduction/prevention; anticipated equipment needs at discharge; predicted duration of therapy intervention; anticipated discharge recommendation  · Impairments Found (describe specific impairments)	gait, locomotion, and balance; muscle strength; poor safety awareness  · Functional Limitations in Following Categories (describe specific limitations)	self-care; home management; community/leisure        PM&R Impression : as above    Current disposition plan recommendation :       d/c home with home physical therapy

## 2024-02-06 NOTE — PROGRESS NOTE ADULT - PROBLEM SELECTOR PLAN 3
Presents with 6 day hx of cough, productive of white sputum, decreased P.O intake, nausea. No sick contacts no recent travel. Does not meet any SIRS criteria  CXR with atelectasis, but no consolidations or effusions. Urine strep/legionella negative procalc 0.38   Ct chest: Bilateral pulmonary nodules or nodular opacities measuring up to about 1 cm .   Tested positive for RSV.     Plan:  -Supportive care: Tessalon Pearle, Hycodan cough syrup, duonebs q6, Tylenol PRN for Fever  -Incentive spirometer   -Isolation precautions  -monitor off abx

## 2024-02-06 NOTE — DISCHARGE NOTE NURSING/CASE MANAGEMENT/SOCIAL WORK - NSDCVIVACCINE_GEN_ALL_CORE_FT
Tdap; 05-Feb-2024 19:08; Annalise Flores (KATHY); [employer]; [id number] (Exp. Date: 30-Nov-2025); IntraMuscular; Deltoid Left.; 0.5 milliLiter(s); VIS (VIS Published: 09-May-2013, VIS Presented: 05-Feb-2024);

## 2024-02-06 NOTE — PROGRESS NOTE ADULT - PROBLEM SELECTOR PLAN 1
Unwitnessed fall, ?LOC.  Patient reports today she was standing in the kitchen and felt suddenly lightheaded and  possibly LOC hitting the ground on her upper back, but did not hit her head. Fall was not witnessed but her  came into the room quickly. She was able to stand and walk unasstisted after this. Denies chest pain, palpitations, dyspnea prior to episode. Similar episode occurred only once before 5 years ago  EKG Sinus Bradycardia and on admission noted to have HR 56->58. Trop wnl, recent TTE with normal LV/RV. CT head/neck were negative for acute fracture. Patient endorsing pain on R am . X ray of R arm-no acute fracture TSH- 1.68 wnl   Orthostatics Negative however unclear if s/p fluids   DDX: Orthostatic (i/s/o decreased p.o intake) vs Cardiac (given bradycardia) vs vasovagal     Plan:  -PT consult  -s/p Tetanus vaccine given superficial ulceration on arm s/p fall, last TdAP 12 years ago per the patient  -wound care consult   -Hold home meds; Losartan-HCTZ , Atenolol 50 MG, Amodipine 5   -Consider escalation of care if patient becomes symptomatic or HR<50

## 2024-02-06 NOTE — PROGRESS NOTE ADULT - TIME BILLING
Patient seen and examined  Discharge today  Hold BP meds   Encourage fluid;  Physical therapy to see prior to discharge ;

## 2024-02-09 LAB
CULTURE RESULTS: SIGNIFICANT CHANGE UP
CULTURE RESULTS: SIGNIFICANT CHANGE UP
SPECIMEN SOURCE: SIGNIFICANT CHANGE UP
SPECIMEN SOURCE: SIGNIFICANT CHANGE UP

## 2024-02-12 ENCOUNTER — APPOINTMENT (OUTPATIENT)
Dept: HEART AND VASCULAR | Facility: CLINIC | Age: 79
End: 2024-02-12

## 2024-02-12 ENCOUNTER — NON-APPOINTMENT (OUTPATIENT)
Age: 79
End: 2024-02-12

## 2024-02-20 ENCOUNTER — APPOINTMENT (OUTPATIENT)
Dept: INTERNAL MEDICINE | Facility: CLINIC | Age: 79
End: 2024-02-20

## 2024-02-22 PROBLEM — Z78.9 OTHER SPECIFIED HEALTH STATUS: Chronic | Status: ACTIVE | Noted: 2024-02-04

## 2024-02-22 NOTE — CHART NOTE - NSCHARTNOTEFT_GEN_A_CORE
Patient will require a rolling walker due to her diagnosis of gait instability to help complete mobility related activities of daily living.
With admission the patient had RSV bronchitis ;   No evidence of pneumonia

## 2024-02-23 ENCOUNTER — APPOINTMENT (OUTPATIENT)
Dept: INTERNAL MEDICINE | Facility: CLINIC | Age: 79
End: 2024-02-23
Payer: MEDICARE

## 2024-02-23 ENCOUNTER — NON-APPOINTMENT (OUTPATIENT)
Age: 79
End: 2024-02-23

## 2024-02-23 VITALS
HEART RATE: 78 BPM | TEMPERATURE: 97.9 F | DIASTOLIC BLOOD PRESSURE: 70 MMHG | OXYGEN SATURATION: 100 % | WEIGHT: 136 LBS | BODY MASS INDEX: 25.03 KG/M2 | SYSTOLIC BLOOD PRESSURE: 147 MMHG | HEIGHT: 62 IN

## 2024-02-23 VITALS — SYSTOLIC BLOOD PRESSURE: 130 MMHG | DIASTOLIC BLOOD PRESSURE: 80 MMHG

## 2024-02-23 DIAGNOSIS — B33.8 OTHER SPECIFIED VIRAL DISEASES: ICD-10-CM

## 2024-02-23 PROCEDURE — 99213 OFFICE O/P EST LOW 20 MIN: CPT

## 2024-02-23 NOTE — HISTORY OF PRESENT ILLNESS
[FreeTextEntry1] : Recent in hospital for RSV  Very weak from infection  [de-identified] : BP was low in hospital and antihypertensive meds were stopped. Today BP noted :  Cardiologist wanted Atenolol stopped.  Stopped steroid because of side affects

## 2024-02-27 ENCOUNTER — APPOINTMENT (OUTPATIENT)
Dept: OPHTHALMOLOGY | Facility: AMBULATORY SURGERY CENTER | Age: 79
End: 2024-02-27

## 2024-02-28 ENCOUNTER — APPOINTMENT (OUTPATIENT)
Dept: OPHTHALMOLOGY | Facility: CLINIC | Age: 79
End: 2024-02-28

## 2024-03-25 ENCOUNTER — APPOINTMENT (OUTPATIENT)
Dept: INTERNAL MEDICINE | Facility: CLINIC | Age: 79
End: 2024-03-25
Payer: MEDICARE

## 2024-03-25 VITALS
BODY MASS INDEX: 24.8 KG/M2 | HEART RATE: 74 BPM | WEIGHT: 140 LBS | TEMPERATURE: 97.2 F | OXYGEN SATURATION: 99 % | HEIGHT: 63 IN | SYSTOLIC BLOOD PRESSURE: 188 MMHG | DIASTOLIC BLOOD PRESSURE: 96 MMHG

## 2024-03-25 DIAGNOSIS — R55 SYNCOPE AND COLLAPSE: ICD-10-CM

## 2024-03-25 PROCEDURE — 99213 OFFICE O/P EST LOW 20 MIN: CPT

## 2024-03-25 NOTE — PHYSICAL EXAM
[No Acute Distress] : no acute distress [Well Nourished] : well nourished [Well Developed] : well developed [Well-Appearing] : well-appearing [Normal Sclera/Conjunctiva] : normal sclera/conjunctiva [PERRL] : pupils equal round and reactive to light [EOMI] : extraocular movements intact [Normal Outer Ear/Nose] : the outer ears and nose were normal in appearance [Normal Oropharynx] : the oropharynx was normal [No JVD] : no jugular venous distention [No Lymphadenopathy] : no lymphadenopathy [Supple] : supple [Thyroid Normal, No Nodules] : the thyroid was normal and there were no nodules present [No Respiratory Distress] : no respiratory distress  [No Accessory Muscle Use] : no accessory muscle use [Clear to Auscultation] : lungs were clear to auscultation bilaterally [Normal Rate] : normal rate  [Regular Rhythm] : with a regular rhythm [Normal S1, S2] : normal S1 and S2 [No Murmur] : no murmur heard [No Carotid Bruits] : no carotid bruits [No Abdominal Bruit] : a ~M bruit was not heard ~T in the abdomen [No Varicosities] : no varicosities [Pedal Pulses Present] : the pedal pulses are present [No Edema] : there was no peripheral edema [No Palpable Aorta] : no palpable aorta [No Extremity Clubbing/Cyanosis] : no extremity clubbing/cyanosis [Soft] : abdomen soft [Non Tender] : non-tender [Non-distended] : non-distended [No Masses] : no abdominal mass palpated [No HSM] : no HSM [Normal Posterior Cervical Nodes] : no posterior cervical lymphadenopathy [Normal Bowel Sounds] : normal bowel sounds [Normal Anterior Cervical Nodes] : no anterior cervical lymphadenopathy [No CVA Tenderness] : no CVA  tenderness [No Spinal Tenderness] : no spinal tenderness [No Joint Swelling] : no joint swelling [Grossly Normal Strength/Tone] : grossly normal strength/tone [No Rash] : no rash [Coordination Grossly Intact] : coordination grossly intact [No Focal Deficits] : no focal deficits [Normal Gait] : normal gait [Deep Tendon Reflexes (DTR)] : deep tendon reflexes were 2+ and symmetric [Normal Insight/Judgement] : insight and judgment were intact [Normal Affect] : the affect was normal

## 2024-03-25 NOTE — ASSESSMENT
[FreeTextEntry1] : BP increased today. Will restart Losartan.  May need the other medication  Take BP at home

## 2024-03-25 NOTE — HISTORY OF PRESENT ILLNESS
[FreeTextEntry1] : All pulmonary issues have resolved BP no up today ; Would continue Magnesium 400 mg  at bedtime

## 2024-04-08 ENCOUNTER — APPOINTMENT (OUTPATIENT)
Dept: INTERNAL MEDICINE | Facility: CLINIC | Age: 79
End: 2024-04-08
Payer: MEDICARE

## 2024-04-08 VITALS
OXYGEN SATURATION: 99 % | TEMPERATURE: 97.3 F | BODY MASS INDEX: 24.8 KG/M2 | DIASTOLIC BLOOD PRESSURE: 77 MMHG | WEIGHT: 140 LBS | SYSTOLIC BLOOD PRESSURE: 119 MMHG | HEIGHT: 63 IN | HEART RATE: 88 BPM

## 2024-04-08 DIAGNOSIS — R35.0 FREQUENCY OF MICTURITION: ICD-10-CM

## 2024-04-08 PROCEDURE — 99213 OFFICE O/P EST LOW 20 MIN: CPT

## 2024-04-08 NOTE — HEALTH RISK ASSESSMENT
[No] : No [No falls in past year] : Patient reported no falls in the past year [0] : 2) Feeling down, depressed, or hopeless: Not at all (0) [[id number]] : 0

## 2024-04-08 NOTE — HISTORY OF PRESENT ILLNESS
[FreeTextEntry1] : Feeling much better No pulmonary complaints  [de-identified] : Has urinary frequency  Wakes up every two hours to urinate

## 2024-04-15 ENCOUNTER — APPOINTMENT (OUTPATIENT)
Dept: HEART AND VASCULAR | Facility: CLINIC | Age: 79
End: 2024-04-15
Payer: MEDICARE

## 2024-04-15 VITALS
HEART RATE: 81 BPM | DIASTOLIC BLOOD PRESSURE: 88 MMHG | WEIGHT: 141 LBS | HEIGHT: 63 IN | SYSTOLIC BLOOD PRESSURE: 148 MMHG | OXYGEN SATURATION: 97 % | BODY MASS INDEX: 24.98 KG/M2

## 2024-04-15 PROCEDURE — G2211 COMPLEX E/M VISIT ADD ON: CPT

## 2024-04-15 PROCEDURE — 99214 OFFICE O/P EST MOD 30 MIN: CPT | Mod: GC

## 2024-04-15 RX ORDER — ATENOLOL 50 MG/1
50 TABLET ORAL
Qty: 90 | Refills: 3 | Status: DISCONTINUED | COMMUNITY
Start: 2022-08-19 | End: 2024-04-15

## 2024-04-15 RX ORDER — POTASSIUM CHLORIDE 1500 MG/1
20 TABLET, EXTENDED RELEASE ORAL
Qty: 60 | Refills: 0 | Status: DISCONTINUED | COMMUNITY
Start: 2022-10-02 | End: 2024-04-15

## 2024-04-15 RX ORDER — AMLODIPINE BESYLATE 5 MG/1
5 TABLET ORAL
Refills: 0 | Status: DISCONTINUED | COMMUNITY
End: 2024-04-15

## 2024-04-15 RX ORDER — PREDNISONE 20 MG/1
20 TABLET ORAL DAILY
Qty: 10 | Refills: 2 | Status: COMPLETED | COMMUNITY
Start: 2024-02-08 | End: 2024-02-15

## 2024-04-15 RX ORDER — HYDROCHLOROTHIAZIDE 25 MG/1
25 TABLET ORAL
Qty: 90 | Refills: 0 | Status: DISCONTINUED | COMMUNITY
Start: 2022-08-22 | End: 2024-04-15

## 2024-04-15 NOTE — PHYSICAL EXAM
[Well Developed] : well developed [Normal Conjunctiva] : normal conjunctiva [Normal S1, S2] : normal S1, S2 [Clear Lung Fields] : clear lung fields [Soft] : abdomen soft [Normal Gait] : normal gait [No Edema] : no edema

## 2024-04-24 NOTE — ASSESSMENT
[FreeTextEntry1] : 79 year old woman h/o HTN, CKD, HLD, arthritis, right knee fracture s/p surgical repair, migraine with aura, remote h/o syncopal episode (suspected vascovagal), recent 2/2024 admission to [facility] for syncopal episode (found to have rsv, katie) who presents for follow-up.   #remote history of syncopal episode episode occurred years ago after getting out of hot shower, suspect vasovagal TTE 1/2024: LVEF 65%, RV nl, mild MR, mild TR, PASP 24, mild PI Holter 12/11- 12/18/23: min HR 45, max HR 87, avg 58; . 4.2% APC/SVE with one episode of idioventricular rhythm 5 beats with one fusion beat on early AM on day 2 of monitoring  #recent hospitalization for syncopal episode suspect vagal given darkening of vision prior to syncope, constitutional sxs x 1 week prior, found to have RSV, KATIE, soft BP and bradycardia which improved with IVF and holding BB - discussed with patient if any repeat episodes of syncope to contact our office  - recent TTE/holter as above  - continuing to hold atenolol/amlodipine for now, c/w losartan - hctz  - repeat CMP on next set of labs  #HLD ASCVD 10 year risk is intermediate 17.9% started crestor 12/2024 - repeat lipid panel on next set of labs  - continue crestor 10mg poq d  #hypertension  - continue losartan/hctz 50-12.5mg poq d (patient has bottle with her, d/c list from [facility] dose is losartan/hctz 50 - 12.5) - atenolol discontinued given episodes of bradycardia and syncopal episode  - continue to hold amlodipine for now  - recheck BP - if still elevated >140, 80 may need to increase losartan to 100 or add second agent  #b/l lung nodules vs nodular opacity CT chest 2/2024  - needs repeat CT scan 5/2024 - pt will see Dr. Vasquez in 5/2024  Case seen and discussed with Dr. Mondragon Cardiology fellow  Attending Attestation:  I performed a history and physical examination of the patient and discussed the management with the patient and the Cardiology fellow. I reviewed the fellow's note, laboratory results, and diagnostic reports, and agree with the documented findings and plan of care.

## 2024-04-24 NOTE — REASON FOR VISIT
[FreeTextEntry1] : 79 year old woman h/o HTN, CKD, HLD, arthritis, right knee fracture s/p surgical repair, migraine with aura presents who initially presented 2023 to establish care. She had a remote history of syncope after taking a hot shower, so syncopal workup was sent. Likely vasovagal.  TTE 2024: LVEF 65%, RV nl, mild MR, mild TR, PASP 24, mild PI  Holter - 23: min HR 45, max HR 87, avg 58; . 4.2% APC/SVE with one episode of idioventricular rhythm 5 beats with one fusion beat on early AM on day 2 of monitoring   Early 2024 she was hospitalized for a syncopal episode. Reports she was cleaning the kitchen when her vision suddenly darkened and she lost consciousness. No chest pain, sob, palpitations, or dizziness prior to syncope. Woke up on the floor and was able to get up without issue.  The week prior to this syncopal episode she was feeling weak with flue like symptoms and SOB.  Her son brought her to ED for evaluation. She was noted to have soft BP with sinus bradycardia. Cr elevated to 2.6. Found to have RSV. TSH nl, cth negative. CT Chest with b/l nodules/nodular opacities. BP and Cr improved with IVF. Cr 1.29 on . Given bradycardia and soft BP, atenolol, losartan - hctz (50 - 12.5) was held on DC. She has seen Dr. Vasquez since [place]. losartan 50-12.5mg po qd was restarted.   Since discharge she feels much better. Able to walk 12 blocks without difficulty. Denies chest pain, sob, or palpitations. Continues to take losartan 50- 12.5mg po qd and crestor 10mg poqd.  No further episodes of syncope. Has an appointment with Dr. Vasquez in May.  SH Former smoker, 1 pack year quit 56 years ago No EtOH Has 3 children, C-sections, uncomplicated  disabled  FH Sister  71 years old MI and CVA Brother MI at mid to late 50s

## 2024-05-02 ENCOUNTER — APPOINTMENT (OUTPATIENT)
Dept: INTERNAL MEDICINE | Facility: CLINIC | Age: 79
End: 2024-05-02
Payer: MEDICARE

## 2024-05-02 ENCOUNTER — NON-APPOINTMENT (OUTPATIENT)
Age: 79
End: 2024-05-02

## 2024-05-02 VITALS
HEIGHT: 63 IN | WEIGHT: 141 LBS | DIASTOLIC BLOOD PRESSURE: 74 MMHG | BODY MASS INDEX: 24.98 KG/M2 | TEMPERATURE: 97.1 F | SYSTOLIC BLOOD PRESSURE: 146 MMHG | HEART RATE: 72 BPM | OXYGEN SATURATION: 99 %

## 2024-05-02 DIAGNOSIS — E78.5 HYPERLIPIDEMIA, UNSPECIFIED: ICD-10-CM

## 2024-05-02 DIAGNOSIS — Z00.00 ENCOUNTER FOR GENERAL ADULT MEDICAL EXAMINATION W/OUT ABNORMAL FINDINGS: ICD-10-CM

## 2024-05-02 DIAGNOSIS — I10 ESSENTIAL (PRIMARY) HYPERTENSION: ICD-10-CM

## 2024-05-02 PROCEDURE — 36415 COLL VENOUS BLD VENIPUNCTURE: CPT

## 2024-05-02 PROCEDURE — G0439: CPT

## 2024-05-02 NOTE — ASSESSMENT
[FreeTextEntry1] : Exam is stable. Medication reviewed with patient. Will get all labs; EKG Further plans after review of studies

## 2024-05-02 NOTE — HEALTH RISK ASSESSMENT
[Good] : ~his/her~  mood as  good [No falls in past year] : Patient reported no falls in the past year [0] : 2) Feeling down, depressed, or hopeless: Not at all (0) [[id number]] : 0 [MammogramComments] : yearly [PapSmearComments] : GYN followed:  [BoneDensityComments] : Getting Prolia injections  [ColonoscopyComments] : Stopped now with age

## 2024-05-02 NOTE — HEALTH RISK ASSESSMENT
[Good] : ~his/her~  mood as  good [No falls in past year] : Patient reported no falls in the past year [0] : 2) Feeling down, depressed, or hopeless: Not at all (0) [[id number]] : 0 [MammogramComments] : yearly [PapSmearComments] : GYN followed:  [BoneDensityComments] : Getting Prolia injections  [ColonoscopyComments] : Stopped now with age

## 2024-05-02 NOTE — HISTORY OF PRESENT ILLNESS
[FreeTextEntry1] : 3Leg cramps improved; Urinary frequency every two three hours but does not want to start medication

## 2024-05-13 LAB
25(OH)D3 SERPL-MCNC: 29.9 NG/ML
ALBUMIN SERPL ELPH-MCNC: 4.1 G/DL
ALP BLD-CCNC: 56 U/L
ALT SERPL-CCNC: 16 U/L
ANION GAP SERPL CALC-SCNC: 14 MMOL/L
AST SERPL-CCNC: 21 U/L
BILIRUB SERPL-MCNC: 0.4 MG/DL
BUN SERPL-MCNC: 31 MG/DL
CALCIUM SERPL-MCNC: 9.7 MG/DL
CHLORIDE SERPL-SCNC: 104 MMOL/L
CHOLEST SERPL-MCNC: 146 MG/DL
CO2 SERPL-SCNC: 25 MMOL/L
CREAT SERPL-MCNC: 1.45 MG/DL
EGFR: 37 ML/MIN/1.73M2
ESTIMATED AVERAGE GLUCOSE: 108 MG/DL
GLUCOSE SERPL-MCNC: 113 MG/DL
HBA1C MFR BLD HPLC: 5.4 %
HCT VFR BLD CALC: 41 %
HDLC SERPL-MCNC: 55 MG/DL
HGB BLD-MCNC: 12.6 G/DL
LDLC SERPL CALC-MCNC: 59 MG/DL
MCHC RBC-ENTMCNC: 30.7 GM/DL
MCHC RBC-ENTMCNC: 30.7 PG
MCV RBC AUTO: 100 FL
NONHDLC SERPL-MCNC: 91 MG/DL
PLATELET # BLD AUTO: 168 K/UL
POTASSIUM SERPL-SCNC: 4.3 MMOL/L
PROT SERPL-MCNC: 6.6 G/DL
RBC # BLD: 4.1 M/UL
RBC # FLD: 13.5 %
SODIUM SERPL-SCNC: 144 MMOL/L
T4 FREE SERPL-MCNC: 1.2 NG/DL
TRIGL SERPL-MCNC: 192 MG/DL
TSH SERPL-ACNC: 1.57 UIU/ML
WBC # FLD AUTO: 6.58 K/UL

## 2024-05-13 RX ORDER — LOSARTAN POTASSIUM 100 MG/1
100 TABLET, FILM COATED ORAL
Qty: 90 | Refills: 3 | Status: ACTIVE | COMMUNITY
Start: 2024-05-13 | End: 1900-01-01

## 2024-05-20 ENCOUNTER — APPOINTMENT (OUTPATIENT)
Dept: PHYSICAL MEDICINE AND REHAB | Facility: CLINIC | Age: 79
End: 2024-05-20
Payer: MEDICARE

## 2024-05-20 ENCOUNTER — RESULT REVIEW (OUTPATIENT)
Age: 79
End: 2024-05-20

## 2024-05-20 DIAGNOSIS — M15.2 BOUCHARD'S NODES (WITH ARTHROPATHY): ICD-10-CM

## 2024-05-20 DIAGNOSIS — M25.661 STIFFNESS OF RIGHT KNEE, NOT ELSEWHERE CLASSIFIED: ICD-10-CM

## 2024-05-20 DIAGNOSIS — R26.9 UNSPECIFIED ABNORMALITIES OF GAIT AND MOBILITY: ICD-10-CM

## 2024-05-20 DIAGNOSIS — R26.89 OTHER ABNORMALITIES OF GAIT AND MOBILITY: ICD-10-CM

## 2024-05-20 DIAGNOSIS — M81.0 AGE-RELATED OSTEOPOROSIS W/OUT CURRENT PATHOLOGICAL FRACTURE: ICD-10-CM

## 2024-05-20 DIAGNOSIS — I33.0 ACUTE AND SUBACUTE INFECTIVE ENDOCARDITIS: ICD-10-CM

## 2024-05-20 DIAGNOSIS — M67.959 UNSPECIFIED DISORDER OF SYNOVIUM AND TENDON, UNSPECIFIED THIGH: ICD-10-CM

## 2024-05-20 DIAGNOSIS — T84.84XA PAIN DUE TO INTERNAL ORTHOPEDIC PROSTHETIC DEVICES, IMPLANTS AND GRAFTS, INITIAL ENCOUNTER: ICD-10-CM

## 2024-05-20 DIAGNOSIS — M79.2 NEURALGIA AND NEURITIS, UNSPECIFIED: ICD-10-CM

## 2024-05-20 PROCEDURE — 99205 OFFICE O/P NEW HI 60 MIN: CPT

## 2024-05-20 PROCEDURE — G2211 COMPLEX E/M VISIT ADD ON: CPT

## 2024-05-21 LAB
APPEARANCE: ABNORMAL
BACTERIA: ABNORMAL /HPF
BILIRUBIN URINE: NEGATIVE
BLOOD URINE: ABNORMAL
CAST: 0 /LPF
COLOR: YELLOW
EPITHELIAL CELLS: 1 /HPF
GLUCOSE QUALITATIVE U: NEGATIVE MG/DL
KETONES URINE: NEGATIVE MG/DL
LEUKOCYTE ESTERASE URINE: ABNORMAL
MICROSCOPIC-UA: NORMAL
MUCUS: PRESENT
NITRITE URINE: NEGATIVE
PH URINE: 6
PROTEIN URINE: 30 MG/DL
RED BLOOD CELLS URINE: 2 /HPF
REVIEW: NORMAL
SPECIFIC GRAVITY URINE: 1.02
UROBILINOGEN URINE: 0.2 MG/DL
WBC CLUMPS: PRESENT
WHITE BLOOD CELLS URINE: 300 /HPF

## 2024-05-21 RX ORDER — CIPROFLOXACIN HYDROCHLORIDE 250 MG/1
250 TABLET, FILM COATED ORAL
Qty: 7 | Refills: 1 | Status: ACTIVE | COMMUNITY
Start: 2024-05-21 | End: 1900-01-01

## 2024-06-06 ENCOUNTER — APPOINTMENT (OUTPATIENT)
Dept: ORTHOPEDIC SURGERY | Facility: CLINIC | Age: 79
End: 2024-06-06
Payer: MEDICARE

## 2024-06-06 DIAGNOSIS — M19.049 PRIMARY OSTEOARTHRITIS, UNSPECIFIED HAND: ICD-10-CM

## 2024-06-06 PROCEDURE — 73130 X-RAY EXAM OF HAND: CPT | Mod: 50

## 2024-06-06 PROCEDURE — 99204 OFFICE O/P NEW MOD 45 MIN: CPT

## 2024-06-06 NOTE — PHYSICAL EXAM
[de-identified] : Physical exam demonstrates the patient to be alert and oriented x 3 and capable of ambulation. The patient is well-developed and well-nourished in no apparent respiratory distress. The majority of the skin is intact bilaterally in the upper extremities without any bilateral elbow lymphadenopathy.  Evaluation of both elbows reveals full symmetric range of motion from full extension to 140 of flexion with full pronation and full supination.  The wrists have symmetric range of motion bilaterally. There is no tenderness over the scaphoid, scapholunate, or lunotriquetral ligaments bilaterally. There is a negative Trivedi's test bilaterally. There is no tenderness over the distal radial ulnar joint or TFCC and no evidence of instability bilaterally. There is no tenderness over the pisotriquetral joint, hamate hook, or CMC joints bilaterally. The patient is nontender over both scaphoids and anatomic snuffbox is bilaterally. There is no clubbing cyanosis or edema.  There are Brie's and Heberden's nodes present over all digits of bilateral hands.  There is mildly diminished active terminal flexion of all digits.  There is mild ulnar deviation at the PIP joint of all fingers.  Nontender to palpation over the PIP joints or DIP joints bilaterally. There is good capillary refill of the digits bilaterally. Sensation is intact to light touch bilaterally. [de-identified] : PA, lateral and oblique x-rays of bilateral hands were obtained to assess for any bony injury, masses or lesions.  There is presence of diffuse arthritic changes at all joints, especially at the PIP and DIP joints.

## 2024-06-06 NOTE — HISTORY OF PRESENT ILLNESS
[FreeTextEntry1] : 79 year old female with past medical history of osteoarthritis presents for evaluation of mild bilateral hand stiffness and hand deformity that has been present for many years. The patient was advised by Dr. Meng to have an initial consultation with me. The patient notes no discomfort of bilateral hands but does note more prominence of joints of all her fingers.  The patient notes that her only concern is that she is unable to wear her rings due to the prominence of the joints of her fingers.

## 2024-06-06 NOTE — ASSESSMENT
[FreeTextEntry1] : My impression is that the patient bilateral hand arthritis. Initial treatment options were discussed. I explained that the condition is not curative and that initial treatment plan is to help control her symptoms. I discussed activity modification, diminishing activities that exacerbate her symptoms (i.e. opening tight jars and bottle caps), anti-inflammatory medication and conservative treatment with occupational therapy.  I advised the patient that  since her symptoms are not bothersome at this time, we can continue to proceed with conservative treatment.  I did advise the patient that if at any point her symptoms do become bothersome, to follow-up with me for reevaluation.  A prescription for OT was given to the patient today.  The patient was in accordance with the plan.

## 2024-06-21 ENCOUNTER — OUTPATIENT (OUTPATIENT)
Dept: OUTPATIENT SERVICES | Facility: HOSPITAL | Age: 79
LOS: 1 days | End: 2024-06-21
Payer: MEDICARE

## 2024-06-21 ENCOUNTER — RESULT REVIEW (OUTPATIENT)
Age: 79
End: 2024-06-21

## 2024-06-21 PROCEDURE — 73564 X-RAY EXAM KNEE 4 OR MORE: CPT

## 2024-06-21 PROCEDURE — 73564 X-RAY EXAM KNEE 4 OR MORE: CPT | Mod: 26,RT

## 2024-06-21 PROCEDURE — 72114 X-RAY EXAM L-S SPINE BENDING: CPT

## 2024-06-21 PROCEDURE — 72114 X-RAY EXAM L-S SPINE BENDING: CPT | Mod: 26

## 2024-06-23 NOTE — PHYSICAL EXAM
[FreeTextEntry1] : Gen: A+O x 3 in NAD Psych: Normal mood and affect. Responds appropriately to commands Eyes: Anicteric. No discharge. EOMI. Resp: Breathing unlabored CV: DP pulses 2+ and equal. No varicosities noted Ext: No c/c/e Skin: No lesions noted   Gait: Non antalgic +  reciprocating heel to toe able to stand on toes and heels WITH hand holding/both hands on counter-top Tandem gait intact WITH hand holding Poor single leg standing balance Romberg negative   Trendelenburg present with right stance leg   Inspection: Spine alignment is midline. Palpation: There is + tenderness over the midline spinous processes, paravertebral muscles of the thoracolumbar region   Lumbar ROM: Flexion, extension, side-bending, rotation, limited in most planes +pain with lateral flexion +pain with oblique extension +pain with lateral rotation   Hip ROM: +pain at terminal ROM right hip. FAIR, FABERE negative bilaterally   + weakness with resisted external rotation of the hip flexed to 90, knee flexed to 90, and hip externally rotated 20 degrees RIGHT (gluteus medius) + weakness with resisted external rotation of the hip flexed to 90, knee flexed to 90, and hip externally rotated 20 degrees LEFT (gluteus medius)     TEST REGION      Hip Flex   Knee Ext   Ankle Dorsi  EHL   Ankle Plantar Strength Right Side  4/5         4/5                  5/5           4/5              5/5                         Strength Left Side.    4/5         5/5                  5/5           4/5              5/5                           Hip abduction R 4/5 L 4/5 Hip adduction R 4/5 L 4/5 Hip extension R 4/5 L 4/5 Knee Flexion R 4-/5 L 4-/5   able to perform 10 calf raises on the left able to perform 10 calf raises on the right  Tone: Normal. No clonus. Sensation: Grossly intact to light touch bilateral lower limbs. Proprioception: Intact at big toes bilaterally. Reflexes: 2+ symmetric knee jerk, ankle jerk. Plantars downgoing bilaterally.   SLR negative bilaterally Crossed SLR negative bilaterally. Slump Test negative bilaterally   neg prone gapping test neg yeoman neg nachlas B/L  RIGHT KNEE: no scars trace effusion no laceration no increased warmth no erythema no varus deformity no valgus deformity absent J sign   ROM Limited range of motion with extension, lacking terminal Limited range of motion with flexion, 0-100   Palpation + crepitus + TTP over the quadriceps tendon neg TTP over the patellar tendon neg TTP over the base of the patella neg TTP over the apex of the patella neg TTP over medial border of the patella neg TTP over lateral border of the patella + TTP over the medial joint line + TTP over the lateral joint line neg TTP over pes anserine area in the medial face of the tibia neg TTP over tibial tuberosity neg TTP over fibular head neg TTP over the popliteal fossa   Manual Muscle Testing 4/5 in all planes with resisted isometric stress   For ipsilateral hip strength: + weakness with resisted external rotation of the hip flexed to 90, knee flexed to 90, and hip externally rotated 20 degrees RIGHT (gluteus medius)   + laxity with varus stress with the knee extended at 0 + laxity with varus stress with the knee extended at 30 neg laxity with valgus stress with the knee extended at 0 neg laxity with valgus stress with the knee extended at 30 neg Lachmann Test neg Anterior drawer neg Posterior drawer neg Clarkes Sign neg Patellar apprehension neg Apleys Rotation-Distraction Test (increased rotation, ligamentous) vs contralateral limb + Apleys Rotation-Compression Test (decreased rotation, meniscal) vs contralateral limb neg Vladislav Test with Tibia Externally Rotated (MM) neg Vladislav Test with Tibia Internally Rotated (LM) neg Thessaly Test   Sensation intact to light touch over all aspects of the right lower leg Distal pulses intact   LEFT KNEE: no scars no effusion no laceration no increased warmth no erythema no varus deformity no valgus deformity absent  J sign   ROM Full range of motion in extension, no flexion contracture No added AROM/PROM with knee extension nearly full range of motion in flexion, 0-135   Palpation + crepitus neg TTP over the quadriceps tendon neg TTP over the patellar tendon neg TTP over the base of the patella neg TTP over the apex of the patella neg TTP over medial border of the patella neg TTP over lateral border of the patella neg TTP over the medial joint line neg TTP over the lateral joint line neg TTP over pes anserine area in the medial face of the tibia neg TTP over tibial tuberosity neg TTP over fibular head neg TTP over the popliteal fossa   Manual Muscle Testing 5/5 in all planes with resisted isometric stress   For ipsilateral hip strength: + weakness with resisted external rotation of the hip flexed to 90, knee flexed to 90, and hip externally rotated 20 degrees LEFT (gluteus medius)  neg laxity with varus stress with the knee extended at 0 neg laxity with varus stress with the knee extended at 30 neg laxity with valgus stress with the knee extended at 0 neg laxity with valgus stress with the knee extended at 30 neg Lachmann Test neg Anterior drawer neg Posterior drawer neg Clarkes Sign neg Patellar apprehension neg Apleys Rotation-Distraction Test (increased rotation, ligamentous) vs contralateral limb neg Apleys Rotation-Compression Test (decreased rotation, meniscal) vs contralateral limb neg Vladislav Test with Tibia Externally Rotated (MM) neg Vladislav Test with Tibia Internally Rotated (LM) neg Thessaly Test   Sensation intact to light touch over all aspects of the right lower leg Distal pulses intact   neg Antalgic gait

## 2024-06-23 NOTE — HISTORY OF PRESENT ILLNESS
[FreeTextEntry1] : Polo Meng M.D. Sports Medicine and Interventional Spine Department of Physical Medicine and Rehabilitation [facility] Orth[facility]edic [facility] [address] [facility], [address] [place] [address]   [facility] [place] at [facility] [address], [address] [place] [address]   For [place] Appointments Phone: [phone] Fax: [phone]   ----------------------------------------------------------------------------------------------------------------------------------------   PATIENT: VALERIANO CONNORS MRN: [id number] YOB: 1945 DATE OF SERVICE: 05/20/2024   May 19, 2024     Dear Drs.   Thank you for referring VALERIANO CONNORS to my Sports and Interventional Spine practice and office. Enclosed is a copy of the patient's consultation/progress note, which includes my complete assessment and recent studies completed during the patient's evaluation.   If you have questions or have any patients who require nonsurgical, non-opiate management of any sports, spine, or musculoskeletal conditions, please do not hesitate to contact my  at [phone].   I look forward to taking care of your patients along with you.   Sincerely,   Polo Meng MD Sports, Interventional Spine, & Regenerative Musculoskeletal Medicine Orth[facility] [place] at [facility]                                                       Initial Consultation: CC: right knee pain   HPI:  This is the first visit to [facility] [place] [facility] Sports Medicine and Interventional Spine Practice.   VALERIANO CONNORS presents with the chief complaint as above.   Initial Hx on 05/20/2024 : Presents in person to [facility] patient injured right knee remotely 27 years ago, brought to EMS on that day, then went to get another opinion at [place] patient had infection, then received a surgical intervention patient has remote right knee radiographs after she suddenly developed right knee pain and profound functional limitation patient had consultation with Orthopedic Surgery Joint Reconstruction, patient went to PT in 2022 The patients difficulties began to acutely worsen after prolonged deconditioning due to RSV from 2-4/2024 patient has worse pain overnight, not as debilitating during the day The pain is graded as 2/10 The pain is described as variable The pain is intermittently worse, points to the right knee The pain does not radiate The patient feels that the pain is overall persistent Patient denies other recent fall, MVA, injury, trauma, or accident besides presenting history above   Aggravating: variable, cannot cite at times positional Alleviating: rest, activity modification, pharmacologic treatments as per intake and as above, hot shower   Meds: denies regular PO pain medications Therapy Program: no recent structured targeted therapy program HEP: doing HEP regularly Injection Hx: denies locally directed treatment to the area in question Imaging Hx: reviewed   Assoc Sx: Denies numbness, Tingling Denies Focal motor weakness in the upper or lower limbs Denies New or worsened bowel or bladder incontinence Denies Saddle anesthesia Denies Using Orthotic(s)/Supportive devices Denies Swelling in the upper/lower extremities + Buckling, 5/13/2024 "gave out a couple of times" Denies Clicking They also deny frequent tripping, falling   ROS: A 14 point review of systems was completed. Positive findings are pain as described above. The remaining systems negative.   Breast Cancer Surveillance: up to date COVID HX: reviewed   Assoc Hx: Ambulates without assistive device Level of functioning: indep with ambulation, indep with ADLs Living Situation: dwelling with steps to enter

## 2024-06-23 NOTE — DATA REVIEWED
[FreeTextEntry1] : ACC: [id number] EXAM: CT CERVICAL SPINE ORDERED BY: CECILY CLEMENTE ACC: [id number] EXAM: CT BRAIN ORDERED BY: CECILY CLEMENTE PROCEDURE DATE: 02/04/2024 INTERPRETATION: PROCEDURE: CT HEAD CT CERVICAL SPINE TECHNIQUE: CT HEAD: Multiple contiguous axial CT images of the head were obtained from the base of the skull to the vertex without the administration of intravenous contrast. Coronal and sagittal reformatted images were obtained. CT CERVICAL SPINE: Multiple axial sections were obtained from the mid orbits to the sternoclavicular joint. Sagittal and coronal reformats were obtained from the axial data set. The images were reviewed in soft tissue and bone windows.   Clinical information: Fall  Comparison: None  FINDINGS:  The ventricles and sulci are within normal limits for patient's stated age.  No acute intracranial hemorrhage, extra-axial fluid collection, mass effect or midline shift..  Gray-white matter differentiation is preserved. There are scattered foci of periventricular and subcortical hypodensities consistent with mild chronic microvascular ischemic disease. .  The bones of the calvarium are intact.  There is right maxillary sinus mucosal thickening. There is mucosal thickening of the bilateral ethmoid air cells. The mastoid air cells are well-aerated..  CT Cervical Spine:  The CT exam demonstrates loss of the normal cervical lordosis which may be secondary to positioning. The vertebral body heights are maintained. There is multilevel disc space narrowing most prominent at C5-C6. The prevertebral soft tissues are within normal limits. The osseous structures are intact without fracture.  At the C2/3 level, there is a disc bulge without significant spinal canal stenosis or neural foraminal narrowing.  At the C3/4 level, there is posterior osseous ridging without significant spinal canal stenosis. There is uncovertebral and facet hypertrophy with severe left and moderate right neural foraminal narrowing.  At the C4/5 level, there is a left paracentral disc protrusion with posterior osseous ridging without significant spinal canal stenosis. There is uncovertebral and facet hypertrophy with moderate to severe bilateral neural foraminal narrowing.  At the C5/6 level, there is posterior osseous ridging without significant spinal canal stenosis. There is uncovertebral and facet hypertrophy with severe bilateral neural foraminal narrowing.  At the C6/7 level, there is no disc herniation. There is no central spinal canal stenosis or neural foramen narrowing.  At the C7/T1 level, there is no disc herniation. There is no central spinal canal stenosis or neural foramen narrowing.   IMPRESSION: CT head: No acute intracranial hemorrhage or calvarial fracture.  CT cervical spine: No acute fracture or malalignment. Multilevel cervical spondylosis.  ACC: [id number] EXAM: XR KNEE 3 VIEWS RT PROCEDURE DATE: 10/03/2022 INTERPRETATION: Clinical history reason for exam: Pain. 3 views. No comparison. Findings/impression: Patellar hardware in satisfactory position with no evidence of hardware location. Knee degenerative changes, chondrocalcinosis. No acute fracture or dislocation..

## 2024-06-23 NOTE — ASSESSMENT
[FreeTextEntry1] :                                                       Assessment/Plan:   VALERIANO CONNORS is a 79 year female with pain here for initial consultation.  Chronic right knee pain H/O chondrocalcinosis of knee, right H/O Osteoporosis   - Tiers of treatment and management of above diagnosis(es) were discussed with patient - Optimal diet, weight, sleep, and lifestyle management to minimize stress and maximize well being counseling provided - Imaging reviewed and discussed with patient - Reviewed previous encounter notes from 10/21/2022 Dr. SHIRAZ Lynn (Orthopedic Surgery Joint Reconstruction), 5/2/2024 Dr. ZEESHAN Vasquez (Internal Medicine) - Patient was advised to start a structured, targeted therapy program 2-3x/wk for 8 wks with goal toward HEP - Patient was educated on an appropriate home exercise program, provided with exercise recommendations, all questions answered - Patient may trial acetaminophen 1000mg up to TID PRN moderate to severe pain and to decrease average consumption of NSAIDs - Patient may trial topical compound cream to the right knee twice per day for the next 7-10 days. Afterwards, they may continue to apply as needed only. Rx entered via portal, written information provided to the patient in addition to verbal explanation regarding the medication - May 20, 2024 provided with referral to Orthopedic Surgery Hand/Upper Extremity - Patient was advised to apply cool compresses or warm heat to affected regions PRN - Radiographs of right knee and lumbar region ordered 05/20/2024    - Educated about red flag symptoms including (but not limited to) new, worsened, or persistent: fever greater than 100F, bowel or bladder incontinence, bowel obstipation, inability to void urine, urinary leakage, Severe nausea or vomiting, Worsening numbness, worsening tingling/paresthesias, and/or new or progressive motor weakness; advised to seek immediate medical attention at his nearest Emergency department should they experience any of the above   - Follow up in 2-3 weeks after imaging, in person in 2 months to assess their progress   I have personally spent a total of at least  minutes preparing, reviewing internal and external records, explaining, counseling, providing necessary information via documented paperwork for this encounter, and coordinating care for this patient encounter.   Thank you, Dr(s), for allowing me to participate in the care of your patient. Please do not hesitate to contact me with questions/concerns.   Polo Meng M.D. Sports and Interventional Spine Department of Physical Medicine and Rehabilitation [facility] Orth[facility] [place] [facility] [address] [place] [address]   Appointments: [phone] Fax: [phone]

## 2024-06-26 ENCOUNTER — NON-APPOINTMENT (OUTPATIENT)
Age: 79
End: 2024-06-26

## 2024-07-16 ENCOUNTER — APPOINTMENT (OUTPATIENT)
Dept: PHYSICAL MEDICINE AND REHAB | Facility: CLINIC | Age: 79
End: 2024-07-16
Payer: MEDICARE

## 2024-07-16 DIAGNOSIS — M67.959 UNSPECIFIED DISORDER OF SYNOVIUM AND TENDON, UNSPECIFIED THIGH: ICD-10-CM

## 2024-07-16 PROCEDURE — G2211 COMPLEX E/M VISIT ADD ON: CPT | Mod: NC

## 2024-07-16 PROCEDURE — 99442: CPT

## 2024-07-18 ENCOUNTER — NON-APPOINTMENT (OUTPATIENT)
Age: 79
End: 2024-07-18

## 2024-07-18 ENCOUNTER — APPOINTMENT (OUTPATIENT)
Dept: PHYSICAL MEDICINE AND REHAB | Facility: CLINIC | Age: 79
End: 2024-07-18
Payer: MEDICARE

## 2024-07-18 DIAGNOSIS — M25.661 STIFFNESS OF RIGHT KNEE, NOT ELSEWHERE CLASSIFIED: ICD-10-CM

## 2024-07-18 DIAGNOSIS — M25.461 EFFUSION, RIGHT KNEE: ICD-10-CM

## 2024-07-18 DIAGNOSIS — R26.89 OTHER ABNORMALITIES OF GAIT AND MOBILITY: ICD-10-CM

## 2024-07-18 DIAGNOSIS — M79.2 NEURALGIA AND NEURITIS, UNSPECIFIED: ICD-10-CM

## 2024-07-18 DIAGNOSIS — M81.0 AGE-RELATED OSTEOPOROSIS W/OUT CURRENT PATHOLOGICAL FRACTURE: ICD-10-CM

## 2024-07-18 DIAGNOSIS — R26.9 UNSPECIFIED ABNORMALITIES OF GAIT AND MOBILITY: ICD-10-CM

## 2024-07-18 PROBLEM — M17.11 TRICOMPARTMENT OSTEOARTHRITIS OF RIGHT KNEE: Status: ACTIVE | Noted: 2024-07-18

## 2024-07-18 PROCEDURE — G2211 COMPLEX E/M VISIT ADD ON: CPT | Mod: NC

## 2024-07-18 PROCEDURE — 99443: CPT

## 2024-07-23 ENCOUNTER — APPOINTMENT (OUTPATIENT)
Dept: PHYSICAL MEDICINE AND REHAB | Facility: CLINIC | Age: 79
End: 2024-07-23

## 2024-07-24 ENCOUNTER — APPOINTMENT (OUTPATIENT)
Dept: MAMMOGRAPHY | Facility: HOSPITAL | Age: 79
End: 2024-07-24
Payer: MEDICARE

## 2024-07-24 ENCOUNTER — RESULT REVIEW (OUTPATIENT)
Age: 79
End: 2024-07-24

## 2024-07-24 ENCOUNTER — NON-APPOINTMENT (OUTPATIENT)
Age: 79
End: 2024-07-24

## 2024-07-24 DIAGNOSIS — T84.84XA PAIN DUE TO INTERNAL ORTHOPEDIC PROSTHETIC DEVICES, IMPLANTS AND GRAFTS, INITIAL ENCOUNTER: ICD-10-CM

## 2024-07-24 DIAGNOSIS — M17.11 UNILATERAL PRIMARY OSTEOARTHRITIS, RIGHT KNEE: ICD-10-CM

## 2024-07-24 PROCEDURE — 77067 SCR MAMMO BI INCL CAD: CPT | Mod: 26

## 2024-07-24 PROCEDURE — 77063 BREAST TOMOSYNTHESIS BI: CPT | Mod: 26

## 2024-07-24 PROCEDURE — 73700 CT LOWER EXTREMITY W/O DYE: CPT | Mod: 26,RT

## 2024-07-25 ENCOUNTER — NON-APPOINTMENT (OUTPATIENT)
Age: 79
End: 2024-07-25

## 2024-07-26 ENCOUNTER — APPOINTMENT (OUTPATIENT)
Dept: CT IMAGING | Facility: HOSPITAL | Age: 79
End: 2024-07-26

## 2024-08-05 ENCOUNTER — APPOINTMENT (OUTPATIENT)
Dept: INTERNAL MEDICINE | Facility: CLINIC | Age: 79
End: 2024-08-05

## 2024-08-05 PROCEDURE — 99213 OFFICE O/P EST LOW 20 MIN: CPT

## 2024-08-05 NOTE — PHYSICAL EXAM
[Normal] : no CVA or spinal tenderness [de-identified] : Arthritis changes noted Right knee swollen

## 2024-08-05 NOTE — HISTORY OF PRESENT ILLNESS
[FreeTextEntry1] : Chief complaint right knee pain PMR follow up noted Knee replacement suggested but does not want surgery  [de-identified] : Using a cane to walk  meds as outlined Being followed By Dr Meng  BP quite high today At home numbers were stable

## 2024-08-06 ENCOUNTER — APPOINTMENT (OUTPATIENT)
Dept: PHYSICAL MEDICINE AND REHAB | Facility: CLINIC | Age: 79
End: 2024-08-06

## 2024-08-06 PROCEDURE — 20611 DRAIN/INJ JOINT/BURSA W/US: CPT | Mod: RT

## 2024-08-06 NOTE — ASSESSMENT
[FreeTextEntry1] :                                                       Assessment/Plan:   VALERIANO CONNORS is a 79 year female with pain here for follow up visit  Effusion of knee, right Moderate tricompartmental osteoarthrosis Status post ORIF of the patella Chronic right knee pain H/O chondrocalcinosis of knee, right H/O Osteoporosis  - 08/06/2024 tolerated ultrasound guided right knee arthrocentesis well in office without complications - Tiers of treatment and management of above diagnosis(es) were discussed with patient - Optimal diet, weight, sleep, and lifestyle management to minimize stress and maximize well being counseling provided - Imaging reviewed and discussed with patient - Reviewed previous encounter notes from 10/21/2022 Dr. SHIRAZ Lynn (Orthopedic Surgery Joint Reconstruction), 5/2/2024 Dr. ZEESHAN Vasquez (Internal Medicine) - Patient was advised to RESUME a structured, targeted therapy program 2-3x/wk for 8 wks with goal toward HEP - Patient was educated on an appropriate home exercise program, provided with exercise recommendations, all questions answered - Patient may trial acetaminophen 1000mg up to TID PRN moderate to severe pain and to decrease average consumption of NSAIDs - Patient may trial topical compound cream to the right knee twice per day for the next 7-10 days. Afterwards, they may continue to apply as needed only. Rx entered via portal, written information provided to the patient in addition to verbal explanation regarding the medication - May 20, 2024 provided with referral to Orthopedic Surgery Hand/Upper Extremity - Patient was advised to apply cool compresses or warm heat to affected regions PRN - Radiographs of right knee and lumbar region ordered 05/20/2024, REVIEWE including Aug 06, 2024  - Aug 06, 2024 referral to (Orthopedic Surgery Joint Reconstruction)    - Educated about red flag symptoms including (but not limited to) new, worsened, or persistent: fever greater than 100F, bowel or bladder incontinence, bowel obstipation, inability to void urine, urinary leakage, Severe nausea or vomiting, Worsening numbness, worsening tingling/paresthesias, and/or new or progressive motor weakness; advised to seek immediate medical attention at his nearest Emergency department should they experience any of the above   - Follow up in person in 2 months to assess their progress   I have personally spent a total of at least 45 minutes preparing, reviewing internal and external records, explaining, counseling, providing necessary information via documented paperwork for this encounter, and coordinating care for this patient encounter.   Thank you, (s), for allowing me to participate in the care of your patient. Please do not hesitate to contact me with questions/concerns.   Polo Meng M.D. Sports and Interventional Spine Department of Physical Medicine and Rehabilitation [facility] Orthopaedic [facility] [address] [place] [address]   Appointments: [phone] Fax: [phone]

## 2024-08-06 NOTE — HISTORY OF PRESENT ILLNESS
[FreeTextEntry1] : Polo Meng M.D. Sports Medicine and Interventional Spine Department of Physical Medicine and Rehabilitation [facility] Orth[facility] [address] [place] [address]   [facility] [place] at [facility] [address] [place] [address]   For [place] Appointments Phone: [phone] Fax: [phone]   ----------------------------------------------------------------------------------------------------------------------------------------   PATIENT: VALERIANO CONNORS MRN: [id number] YOB: 1945 DATE OF SERVICE: Aug 06, 2024    Aug 06, 2024    Dear Drs.   Thank you for referring VALERIANO CONNORS to my Sports and Interventional Spine practice and office. Enclosed is a copy of the patient's consultation/progress note, which includes my complete assessment and recent studies completed during the patient's evaluation.   If you have questions or have any patients who require nonsurgical, non-opiate management of any sports, spine, or musculoskeletal conditions, please do not hesitate to contact my  at [phone].   I look forward to taking care of your patients along with you.   Sincerely,   Polo Meng MD Sports, Interventional Spine, & Regenerative Musculoskeletal Medicine Orthopaedic [place] [facility]                                                       Follow Up Visit CC: right knee pain   HPI:  This is a follow up visit to [facility] [place] [facility] Interventional Spine [facility].   VALERIANO CONNORS presents with the chief complaint as above.  Interval Hx on Aug 06, 2024: presents for follow up. Since the last visit, patient has obtain CT of the right knee. we had 3 additional encounters via telemedicine due to increased pain, results review. There have been no significant changes to their aggravating or alleviating factors since the last visit. Pharmacologic treatments now include OTC analgesics PRN, but otherwise pharmacologic treatments are minimal. Denies new or worsened numbness, tingling, or focal motor deficit. Denies interval fall, accident, or injury. Denies change in bowel or bladder functioning. patient has been taking self directed ibuprofen and tylenol for the past few weeks. patient contends that the knee continues to be swollen. patient shares having to resume anti-hypertensives due to elevated BP. patient has resumed walking, with some difficulty. patient notes that their PT program is on hiatus since their pain worsened late last month. patient continues to avoid driving due to knee pain. patient interested in prescribing   7/25/2024: spoke with patient. reviewed results of CT right knee. patient is not agreeable to discussion with (Orthopedic Surgery Joint Reconstruction regarding their right knee. patient encouraged to gather information prior to local treatments for the knee, patient would like to defer any discussion with Orthopedic Surgery Joint Reconstruction indefinitely. advised to follow up with me as planned 8/6/2024 for possible arthrocentesis.  07/24/2024 left VM at 5:03PM. relayed general details of interval imaging CT right knee performed on 7/24/24. advised them to proceed with the rest of the plan of care including further specialist consultation. entered consultation for (Orthopedic Surgery Joint Reconstruction. advised that time frame for follow up remains unchanged per our most recent encounter.  Interval Hx on Jul 18, 2024: presents for follow up. At the last visit, she was experiencing exquisite pain over the knee. patient has started BID tylenol which has markedly improved her knee pain and improved her functional use of the limb. patient notes persistent paroxysmal pain and right knee buckling. she endorse persistent swelling of the right knee. Patient reports increasing frequency of severe pain as well as increasingly severe intensity of pain from their right knee. Patient reports having difficulty with ambulation indoors due to pain and stiffness from the right knee. Patient reports difficulty with everyday activities including reaching down/bending forward for lower body dressing, toileting, hygiene, and transferring about surfaces within her home for sitting, resting. when her pain worsens, there have been no significant changes to their aggravating or alleviating factors since the last visit. Pharmacologic treatments now include OTC analgesics PRN. Denies new or worsened numbness, tingling, or focal motor deficit. Denies interval fall, accident, or injury. 07/18/2024: CT right knee without contrast is indicated given that the pt has not improved with tylenol, ibuprofen, naproxen, meloxicam, they obtained non-diagnostic radiographs 6/21/2024, and physical therapy/home exercise program>6 weeks [[facility] on [address]]. Patient's imaging is medically necessary to outline targets for locally (interventional) directed treatments and/or guide surgical management. Patient will follow up with our practice after CT right knee is complete. I have personally spent a total of at least 25 minutes preparing, reviewing internal and external records, explaining, counseling, and coordinating care for this patient encounter. I have spent 7 minutes with the patient on the telephone.  Interval Hx on Jul 16, 2024: presents for follow up. At the last visit, we recommended course of hip PT, topical compound to the right knee and imaging of the knee, lumbar region as well as Orthopedic Surgery Hand/Upper Extremity consultation. patient reports sudden onset pain Jul 16, 2024 without triggering events. has been consistently attending PT and following most recent session felt remarkably well only to have sudden onset lower limb pain developed hours later while home in a seated position. There have been no significant changes to their aggravating or alleviating factors since the last visit. With Pt session(s), they relate significant improvements in pain previously associated with known exacerbating, aggravating factors and situations. Pharmacologic treatments now include OTC analgesics PRN, but otherwise pharmacologic treatments are minimal. Denies new or worsened numbnss, tingling, or focal motor deficit. Denies interval fall, accident, or injury. Denies change in bowel or bladder functioning. Jul 16, 2024 Advised ED/Urgent Care evaluation in the next 24-36 hours should their immobility, pain with ambulation in the lower leg persist or respond incompletely to tylenol. Jul 16, 2024 Advised trial of tylenol 1000mg. Patient will follow up with our practice within the week to re-assess their condition. I have personally spent a total of at least 25 minutes preparing, reviewing internal and external records, explaining, counseling, and coordinating care for this patient encounter. I have spent 8 minutes with the patient on the telephone.  06/26/2024 left VM at 1:35PM. relayed general details of interval imaging performed on 6/21/2024. advised them to proceed with the rest of the plan of care including further imaging. advised that time frame for follow up remains unchanged per our most recent encounter.  Initial Hx on 05/20/2024: Presents in person to [facility]. patient injured right knee remotely 27 years ago, brought to EMS on that day, then went to get another opinion at [facility]. patient had infection, then received a surgical intervention. patient has remote right knee radiographs after she suddenly developed right knee pain and profound functional limitation. patient had consultation with Orthopedic Surgery Joint Reconstruction, patient went to PT in 2022. The patients difficulties began to acutely worsen after prolonged deconditioning due to RSV from 2-4/2024. patient has worse pain overnight, not as debilitating during the day. The pain is graded as 2/10. The pain is described as variable. The pain is intermittently worse, points to the right knee. The pain does not radiate. The patient feels that the pain is overall persistent. Patient denies other recent fall, MVA, injury, trauma, or accident besides presenting history above. Aggravating: variable, cannot cite at times positional. Alleviating: rest, activity modification, pharmacologic treatments as per intake and as above, hot shower   Meds: denies regular PO pain medications Therapy Program: no recent structured targeted therapy program HEP: doing HEP regularly Injection Hx: denies locally directed treatment to the area in question Imaging Hx: reviewed   Assoc Sx: Denies numbness, Tingling Denies Focal motor weakness in the upper or lower limbs Denies New or worsened bowel or bladder incontinence Denies Saddle anesthesia Denies Using Orthotic(s)/Supportive devices Denies Swelling in the upper/lower extremities + Buckling, 5/13/2024 "gave out a couple of times" Denies Clicking They also deny frequent tripping, falling   ROS: A 14 point review of systems was completed. Positive findings are pain as described above. The remaining systems negative.   Breast Cancer Surveillance: up to date COVID HX: reviewed   Assoc Hx: Ambulates without assistive device Level of functioning: indep with ambulation, indep with ADLs Living Situation: dwelling with steps to enter

## 2024-08-06 NOTE — PROCEDURE
[de-identified] : PROCEDURE: RIGHT Ultrasound guided Knee aspiration injection PROCEDURE DATE: 2024  PATIENT: VALERIANO CONNORS  : Mar 20 1945  MRN: [id number]  FINDINGS: large effusion noted on right side PROCEDURE NOTE: After discussing the risks, benefits, and alternatives to the procedure, the patient agreed to have the injection performed with consent obtained. In order to better visualize the the joint space, US guided images were obtained, using a 10mHz linear array transducer. Findings are as above.The knee was cleaned with Chlorhexidine. Using a 25 G 1.5 inch needle, 2 cc of 1% lidocaine was use for superficial anesthesia. Afterwards, this was switched to a large syringe with an 18 gauge 1.5 inch needle 15 cc serosanguinous was aspirated on the right.  The above steps were repeated on the right side.  The patient tolerated the procedure well, experienced mild-moderate relief, and was instructed to use cool compresses on the knee as needed.

## 2024-08-06 NOTE — DATA REVIEWED
[FreeTextEntry1] : CC: [id number]     EXAM:  CT KNEE ONLY RT   ORDERED BY: TESFAYE LOYA PROCEDURE DATE:  07/24/2024 INTERPRETATION:  CT OF THE RIGHT KNEE  WITHOUT CONTRAST. CLINICAL INFORMATION: Right knee pain. TECHNIQUE: Axial CT images were obtained of the right knee with coronal and sagittal reconstructions. No contrast was administered. FINDINGS: Moderate tricompartmental osteoarthrosis with joint space narrowing and osseous productive change. Chondrocalcinosis within the medial and lateral compartments. The patient is status post ORIF of the patella with 3 screws for patellar fixation. Extensor mechanism is intact with small enthesophyte formation at the superior pole of the patella. There is a moderate-sized joint effusion with calcific debris within the suprapatellar region. IMPRESSION: Moderate tricompartmental osteoarthrosis with joint space narrowing and osteophyte formation along with chondrocalcinosis in the medial and lateral compartments. Moderate size joint effusion with calcific debris in the suprapatellar region. Status post ORIF of the patella. No acute fracture.  ACC: [id number] EXAM: CT CERVICAL SPINE ORDERED BY: CECILY CLEMENTE ACC: [id number] EXAM: CT BRAIN ORDERED BY: CECILY CLEMENTE PROCEDURE DATE: 02/04/2024 INTERPRETATION: PROCEDURE: CT HEAD CT CERVICAL SPINE TECHNIQUE: CT HEAD: Multiple contiguous axial CT images of the head were obtained from the base of the skull to the vertex without the administration of intravenous contrast. Coronal and sagittal reformatted images were obtained. CT CERVICAL SPINE: Multiple axial sections were obtained from the mid orbits to the sternoclavicular joint. Sagittal and coronal reformats were obtained from the axial data set. The images were reviewed in soft tissue and bone windows.   Clinical information: Fall  Comparison: None  FINDINGS:  The ventricles and sulci are within normal limits for patient's stated age.  No acute intracranial hemorrhage, extra-axial fluid collection, mass effect or midline shift..  Gray-white matter differentiation is preserved. There are scattered foci of periventricular and subcortical hypodensities consistent with mild chronic microvascular ischemic disease. .  The bones of the calvarium are intact.  There is right maxillary sinus mucosal thickening. There is mucosal thickening of the bilateral ethmoid air cells. The mastoid air cells are well-aerated..  CT Cervical Spine:  The CT exam demonstrates loss of the normal cervical lordosis which may be secondary to positioning. The vertebral body heights are maintained. There is multilevel disc space narrowing most prominent at C5-C6. The prevertebral soft tissues are within normal limits. The osseous structures are intact without fracture.  At the C2/3 level, there is a disc bulge without significant spinal canal stenosis or neural foraminal narrowing.  At the C3/4 level, there is posterior osseous ridging without significant spinal canal stenosis. There is uncovertebral and facet hypertrophy with severe left and moderate right neural foraminal narrowing.  At the C4/5 level, there is a left paracentral disc protrusion with posterior osseous ridging without significant spinal canal stenosis. There is uncovertebral and facet hypertrophy with moderate to severe bilateral neural foraminal narrowing.  At the C5/6 level, there is posterior osseous ridging without significant spinal canal stenosis. There is uncovertebral and facet hypertrophy with severe bilateral neural foraminal narrowing.  At the C6/7 level, there is no disc herniation. There is no central spinal canal stenosis or neural foramen narrowing.  At the C7/T1 level, there is no disc herniation. There is no central spinal canal stenosis or neural foramen narrowing.   IMPRESSION: CT head: No acute intracranial hemorrhage or calvarial fracture.  CT cervical spine: No acute fracture or malalignment. Multilevel cervical spondylosis.  ACC: [id number] EXAM: XR KNEE 3 VIEWS RT PROCEDURE DATE: 10/03/2022 INTERPRETATION: Clinical history reason for exam: Pain. 3 views. No comparison. Findings/impression: Patellar hardware in satisfactory position with no evidence of hardware location. Knee degenerative changes, chondrocalcinosis. No acute fracture or dislocation..

## 2024-08-06 NOTE — HISTORY OF PRESENT ILLNESS
[FreeTextEntry1] : Polo Meng M.D. Sports Medicine and Interventional Spine Department of Physical Medicine and Rehabilitation [facility] Orthopaedic [facility] [address] [place] [address]   [facility] Orthopaedic [place] at [facility] [address], [address] [place] [address]   For [place] Appointments Phone: [phone] Fax: [phone]   ----------------------------------------------------------------------------------------------------------------------------------------   PATIENT: VALERIANO CONNORS MRN: [id number] YOB: 1945 DATE OF SERVICE: Aug 06, 2024    Aug 06, 2024    Dear Drs.   Thank you for referring VALERIANO CONNORS to my Sports and Interventional Spine practice and office. Enclosed is a copy of the patient's consultation/progress note, which includes my complete assessment and recent studies completed during the patient's evaluation.   If you have questions or have any patients who require nonsurgical, non-opiate management of any sports, spine, or musculoskeletal conditions, please do not hesitate to contact my  at [phone].   I look forward to taking care of your patients along with you.   Sincerely,   Polo Meng MD Sports, Interventional Spine, & Regenerative Musculoskeletal Medicine Orthopaedic [place] [facility]                                                       Follow Up Visit CC: right knee pain   HPI:  This is a follow up visit to [facility]opa[facility] [place] at [facility] Sports Medicine and Interventional Spine Practice.   VALERIANO CONNORS presents with the chief complaint as above.  Interval Hx on Aug 06, 2024: presents for follow up. Since the last visit, patient has obtain CT of the right knee. we had 3 additional encounters via telemedicine due to increased pain, results review. There have been no significant changes to their aggravating or alleviating factors since the last visit. Pharmacologic treatments now include OTC analgesics PRN, but otherwise pharmacologic treatments are minimal. Denies new or worsened numbness, tingling, or focal motor deficit. Denies interval fall, accident, or injury. Denies change in bowel or bladder functioning. patient has been taking self directed ibuprofen and tylenol for the past few weeks. patient contends that the knee continues to be swollen. patient shares having to resume anti-hypertensives due to elevated BP. patient has resumed walking, with some difficulty. patient notes that their PT program is on hiatus since their pain worsened late last month. patient continues to avoid driving due to knee pain. patient interested in prescribing   7/25/2024: spoke with patient. reviewed results of CT right knee. patient is not agreeable to discussion with (Orthopedic Surgery Joint Reconstruction regarding their right knee. patient encouraged to gather information prior to local treatments for the knee, patient would like to defer any discussion with Orthopedic Surgery Joint Reconstruction indefinitely. advised to follow up with me as planned 8/6/2024 for possible arthrocentesis.  07/24/2024 left VM at 5:03PM. relayed general details of interval imaging CT right knee performed on 7/24/24. advised them to proceed with the rest of the plan of care including further specialist consultation. entered consultation for (Orthopedic Surgery Joint Reconstruction. advised that time frame for follow up remains unchanged per our most recent encounter.  Interval Hx on Jul 18, 2024: presents for follow up. At the last visit, she was experiencing exquisite pain over the knee. patient has started BID tylenol which has markedly improved her knee pain and improved her functional use of the limb. patient notes persistent paroxysmal pain and right knee buckling. she endorse persistent swelling of the right knee. Patient reports increasing frequency of severe pain as well as increasingly severe intensity of pain from their right knee. Patient reports having difficulty with ambulation indoors due to pain and stiffness from the right knee. Patient reports difficulty with everyday activities including reaching down/bending forward for lower body dressing, toileting, hygiene, and transferring about surfaces within her home for sitting, resting. when her pain worsens, there have been no significant changes to their aggravating or alleviating factors since the last visit. Pharmacologic treatments now include OTC analgesics PRN. Denies new or worsened numbness, tingling, or focal motor deficit. Denies interval fall, accident, or injury. 07/18/2024: CT right knee without contrast is indicated given that the pt has not improved with tylenol, ibuprofen, naproxen, meloxicam, they obtained non-diagnostic radiographs 6/21/2024, and physical therapy/home exercise program>6 weeks [[facility] PT on [address]]. Patient's imaging is medically necessary to outline targets for locally (interventional) directed treatments and/or guide surgical management. Patient will follow up with our practice after CT right knee is complete. I have personally spent a total of at least 25 minutes preparing, reviewing internal and external records, explaining, counseling, and coordinating care for this patient encounter. I have spent 7 minutes with the patient on the telephone.  Interval Hx on Jul 16, 2024: presents for follow up. At the last visit, we recommended course of hip PT, topical compound to the right knee and imaging of the knee, lumbar region as well as Orthopedic Surgery Hand/Upper Extremity consultation. patient reports sudden onset pain Jul 16, 2024 without triggering events. has been consistently attending PT and following most recent session felt remarkably well only to have sudden onset lower limb pain developed hours later while home in a seated position. There have been no significant changes to their aggravating or alleviating factors since the last visit. With Pt session(s), they relate significant improvements in pain previously associated with known exacerbating, aggravating factors and situations. Pharmacologic treatments now include OTC analgesics PRN, but otherwise pharmacologic treatments are minimal. Denies new or worsened numbnss, tingling, or focal motor deficit. Denies interval fall, accident, or injury. Denies change in bowel or bladder functioning. Jul 16, 2024 Advised ED/Urgent Care evaluation in the next 24-36 hours should their immobility, pain with ambulation in the lower leg persist or respond incompletely to tylenol. Jul 16, 2024 Advised trial of tylenol 1000mg. Patient will follow up with our practice within the week to re-assess their condition. I have personally spent a total of at least 25 minutes preparing, reviewing internal and external records, explaining, counseling, and coordinating care for this patient encounter. I have spent 8 minutes with the patient on the telephone.  06/26/2024 left VM at 1:35PM. relayed general details of interval imaging performed on 6/21/2024. advised them to proceed with the rest of the plan of care including further imaging. advised that time frame for follow up remains unchanged per our most recent encounter.  Initial Hx on 05/20/2024: Presents in person to [facility]. patient injured right knee remotely 27 years ago, brought to EMS on that day, then went to get another opinion at [place]. patient had infection, then received a surgical intervention. patient has remote right knee radiographs after she suddenly developed right knee pain and profound functional limitation. patient had consultation with Orthopedic Surgery Joint Reconstruction, patient went to PT in 2022. The patients difficulties began to acutely worsen after prolonged deconditioning due to RSV from 2-4/2024. patient has worse pain overnight, not as debilitating during the day. The pain is graded as 2/10. The pain is described as variable. The pain is intermittently worse, points to the right knee. The pain does not radiate. The patient feels that the pain is overall persistent. Patient denies other recent fall, MVA, injury, trauma, or accident besides presenting history above. Aggravating: variable, cannot cite at times positional. Alleviating: rest, activity modification, pharmacologic treatments as per intake and as above, hot shower   Meds: denies regular PO pain medications Therapy Program: no recent structured targeted therapy program HEP: doing HEP regularly Injection Hx: denies locally directed treatment to the area in question Imaging Hx: reviewed   Assoc Sx: Denies numbness, Tingling Denies Focal motor weakness in the upper or lower limbs Denies New or worsened bowel or bladder incontinence Denies Saddle anesthesia Denies Using Orthotic(s)/Supportive devices Denies Swelling in the upper/lower extremities + Buckling, 5/13/2024 "gave out a couple of times" Denies Clicking They also deny frequent tripping, falling   ROS: A 14 point review of systems was completed. Positive findings are pain as described above. The remaining systems negative.   Breast Cancer Surveillance: up to date COVID HX: reviewed   Assoc Hx: Ambulates without assistive device Level of functioning: indep with ambulation, indep with ADLs Living Situation: dwelling with steps to enter

## 2024-08-06 NOTE — PROCEDURE
[de-identified] : PROCEDURE: RIGHT Ultrasound guided Knee aspiration injection PROCEDURE DATE: 2024  PATIENT: VALERIANO CONNORS  : Mar 20 1945  MRN: [id number]  FINDINGS: large effusion noted on right side PROCEDURE NOTE: After discussing the risks, benefits, and alternatives to the procedure, the patient agreed to have the injection performed with consent obtained. In order to better visualize the the joint space, US guided images were obtained, using a 10mHz linear array transducer. Findings are as above.The knee was cleaned with Chlorhexidine. Using a 25 G 1.5 inch needle, 2 cc of 1% lidocaine was use for superficial anesthesia. Afterwards, this was switched to a large syringe with an 18 gauge 1.5 inch needle 15 cc serosanguinous was aspirated on the right.  The above steps were repeated on the right side.  The patient tolerated the procedure well, experienced mild-moderate relief, and was instructed to use cool compresses on the knee as needed.

## 2024-08-06 NOTE — DATA REVIEWED
[FreeTextEntry1] : CC: [id number]     EXAM:  CT KNEE ONLY RT   ORDERED BY: TESFAYE LOYA PROCEDURE DATE:  07/24/2024 INTERPRETATION:  CT OF THE RIGHT KNEE  WITHOUT CONTRAST. CLINICAL INFORMATION: Right knee pain. TECHNIQUE: Axial CT images were obtained of the right knee with coronal and sagittal reconstructions. No contrast was administered. FINDINGS: Moderate tricompartmental osteoarthrosis with joint space narrowing and osseous productive change. Chondrocalcinosis within the medial and lateral compartments. The patient is status post ORIF of the patella with 3 screws for patellar fixation. Extensor mechanism is intact with small enthesophyte formation at the superior pole of the patella. There is a moderate-sized joint effusion with calcific debris within the suprapatellar region. IMPRESSION: Moderate tricompartmental osteoarthrosis with joint space narrowing and osteophyte formation along with chondrocalcinosis in the medial and lateral compartments. Moderate size joint effusion with calcific debris in the suprapatellar region. Status post ORIF of the patella. No acute fracture.  ACC: [id number] EXAM: CT CERVICAL SPINE ORDERED BY: CECILY CLEMENTE ACC: [id number] EXAM: CT BRAIN ORDERED BY: CECILY CLEMENTE PROCEDURE DATE: 02/04/2024 INTERPRETATION: PROCEDURE: CT HEAD CT CERVICAL SPINE TECHNIQUE: CT HEAD: Multiple contiguous axial CT images of the head were obtained from the base of the skull to the vertex without the administration of intravenous contrast. Coronal and sagittal reformatted images were obtained. CT CERVICAL SPINE: Multiple axial sections were obtained from the mid orbits to the sternoclavicular joint. Sagittal and coronal reformats were obtained from the axial data set. The images were reviewed in soft tissue and bone windows.   Clinical information: Fall  Comparison: None  FINDINGS:  The ventricles and sulci are within normal limits for patient's stated age.  No acute intracranial hemorrhage, extra-axial fluid collection, mass effect or midline shift..  Gray-white matter differentiation is preserved. There are scattered foci of periventricular and subcortical hypodensities consistent with mild chronic microvascular ischemic disease. .  The bones of the calvarium are intact.  There is right maxillary sinus mucosal thickening. There is mucosal thickening of the bilateral ethmoid air cells. The mastoid air cells are well-aerated..  CT Cervical Spine:  The CT exam demonstrates loss of the normal cervical lordosis which may be secondary to positioning. The vertebral body heights are maintained. There is multilevel disc space narrowing most prominent at C5-C6. The prevertebral soft tissues are within normal limits. The osseous structures are intact without fracture.  At the C2/3 level, there is a disc bulge without significant spinal canal stenosis or neural foraminal narrowing.  At the C3/4 level, there is posterior osseous ridging without significant spinal canal stenosis. There is uncovertebral and facet hypertrophy with severe left and moderate right neural foraminal narrowing.  At the C4/5 level, there is a left paracentral disc protrusion with posterior osseous ridging without significant spinal canal stenosis. There is uncovertebral and facet hypertrophy with moderate to severe bilateral neural foraminal narrowing.  At the C5/6 level, there is posterior osseous ridging without significant spinal canal stenosis. There is uncovertebral and facet hypertrophy with severe bilateral neural foraminal narrowing.  At the C6/7 level, there is no disc herniation. There is no central spinal canal stenosis or neural foramen narrowing.  At the C7/T1 level, there is no disc herniation. There is no central spinal canal stenosis or neural foramen narrowing.   IMPRESSION: CT head: No acute intracranial hemorrhage or calvarial fracture.  CT cervical spine: No acute fracture or malalignment. Multilevel cervical spondylosis.  ACC: [id number] EXAM: XR KNEE 3 VIEWS RT PROCEDURE DATE: 10/03/2022 INTERPRETATION: Clinical history reason for exam: Pain. 3 views. No comparison. Findings/impression: Patellar hardware in satisfactory position with no evidence of hardware location. Knee degenerative changes, chondrocalcinosis. No acute fracture or dislocation..

## 2024-08-06 NOTE — ASSESSMENT
[FreeTextEntry1] :                                                       Assessment/Plan:   VALERIANO CONNORS is a 79 year female with pain here for follow up visit  Effusion of knee, right Moderate tricompartmental osteoarthrosis Status post ORIF of the patella Chronic right knee pain H/O chondrocalcinosis of knee, right H/O Osteoporosis  - 08/06/2024 tolerated ultrasound guided right knee arthrocentesis well in office without complications - Tiers of treatment and management of above diagnosis(es) were discussed with patient - Optimal diet, weight, sleep, and lifestyle management to minimize stress and maximize well being counseling provided - Imaging reviewed and discussed with patient - Reviewed previous encounter notes from 10/21/2022 Dr. SHIRAZ Lynn (Orthopedic Surgery Joint Reconstruction), 5/2/2024 Dr. ZEESHAN Vasquez (Internal Medicine) - Patient was advised to RESUME a structured, targeted therapy program 2-3x/wk for 8 wks with goal toward HEP - Patient was educated on an appropriate home exercise program, provided with exercise recommendations, all questions answered - Patient may trial acetaminophen 1000mg up to TID PRN moderate to severe pain and to decrease average consumption of NSAIDs - Patient may trial topical compound cream to the right knee twice per day for the next 7-10 days. Afterwards, they may continue to apply as needed only. Rx entered via portal, written information provided to the patient in addition to verbal explanation regarding the medication - May 20, 2024 provided with referral to Orthopedic Surgery Hand/Upper Extremity - Patient was advised to apply cool compresses or warm heat to affected regions PRN - Radiographs of right knee and lumbar region ordered 05/20/2024, REVIEWE including Aug 06, 2024  - Aug 06, 2024 referral to (Orthopedic Surgery Joint Reconstruction)    - Educated about red flag symptoms including (but not limited to) new, worsened, or persistent: fever greater than 100F, bowel or bladder incontinence, bowel obstipation, inability to void urine, urinary leakage, Severe nausea or vomiting, Worsening numbness, worsening tingling/paresthesias, and/or new or progressive motor weakness; advised to seek immediate medical attention at his nearest Emergency department should they experience any of the above   - Follow up in person in 2 months to assess their progress   I have personally spent a total of at least 45 minutes preparing, reviewing internal and external records, explaining, counseling, providing necessary information via documented paperwork for this encounter, and coordinating care for this patient encounter.   Thank you, (s), for allowing me to participate in the care of your patient. Please do not hesitate to contact me with questions/concerns.   Polo Meng M.D. Sports and Interventional Spine Department of Physical Medicine and Rehabilitation [facility] Orthopaedic [facility] [address] [place] [address]   Appointments: [phone] Fax: [phone]

## 2024-08-09 ENCOUNTER — APPOINTMENT (OUTPATIENT)
Dept: ORTHOPEDIC SURGERY | Facility: CLINIC | Age: 79
End: 2024-08-09

## 2024-08-09 PROBLEM — M25.461 EFFUSION OF RIGHT KNEE: Noted: 2024-07-18

## 2024-08-09 PROBLEM — M11.261 CHONDROCALCINOSIS OF RIGHT KNEE: Noted: 2022-10-21

## 2024-08-09 PROBLEM — Z80.9 FAMILY HISTORY OF MALIGNANT NEOPLASM: Status: ACTIVE | Noted: 2024-08-09

## 2024-08-09 PROBLEM — R26.89 IMPAIRMENT OF BALANCE: Noted: 2024-05-20

## 2024-08-09 PROBLEM — T84.84XA PAINFUL ORTHOPAEDIC HARDWARE: Noted: 2024-05-20

## 2024-08-09 PROBLEM — M25.661 DECREASED RANGE OF MOTION OF RIGHT KNEE: Noted: 2024-05-20

## 2024-08-09 PROCEDURE — 20610 DRAIN/INJ JOINT/BURSA W/O US: CPT | Mod: RT

## 2024-08-09 PROCEDURE — 99214 OFFICE O/P EST MOD 30 MIN: CPT | Mod: 25

## 2024-08-12 NOTE — HISTORY OF PRESENT ILLNESS
[de-identified] : 08/09/2024: 79 year old female previously seen here for right knee pain associated with associated prior ORIF of patella fracture, osteoarthritis, and possible pseudogout. She was last seen here about 2 years ago. She reports one more episode of right knee pain and swelling in the late winter/early spring of this year, which she treated with relative rest. She has been following with Dr. Meng, who performed a right knee aspiration 3 days ago that demonstrated no evidence of active inflammation or infection. She was taking ibuprofen with great relief, but she discontinued it secondary to her history of CKD and HTN. She has been taking Tylenol with some relief in limited doses. She is ambulating without the use of an assistive device. She complains more of stiffness than pain and a constant awareness of the knee that was not present before.   10/21/22: 77 y/o female presents for right knee pain for about 1 month. Patient states she got up from the couch walked to the bathroom and felt her right knee give out. She denies any fall or precipitating events. She went to the ER and was provided ACE compression. She admits being opposed to pain medication and has not used anything for pain relief. Symptoms have spontaneously recovered since then. Describes her pain as a persisting dull sensations and rates it 0/10.   PSH: Right patella fracture repair 25 years ago. She reports no residual pain following surgery.

## 2024-08-12 NOTE — ADDENDUM
[FreeTextEntry1] : I, Bernice Caldwell, documented this note as a scribe on behalf of Dr. Abhishek Lynn on 08/09/2024.

## 2024-08-12 NOTE — PHYSICAL EXAM
[de-identified] : General appearance: well nourished and hydrated, pleasant, alert and oriented x 3, cooperative. HEENT: normocephalic, EOM intact, wearing mask, external auditory canal clear. Cardiovascular: no lower leg edema, no varicosities, dorsalis pedis pulses palpable and symmetric. Lymphatics: no palpable lymphadenopathy, no lymphedema. Neurologic: sensation is normal, no muscle weakness in upper or lower extremities, patella tendon reflexes present and symmetric. Dermatologic: skin moist, warm, no rash. Spine: cervical spine with normal lordosis and painless range of motion, thoracic spine with painless range of motion, lumbosacral spine with normal lordosis and painless range of motion.  No tenderness to palpation along midline spine and paraspinal musculature.  Sacroiliac joints nontender bilaterally. Negative SLR and crossed SLR tests bilaterally. Exaggerated thoracic kyphosis and has overall positive sagittal balance Gait: Transitions easily from sitting to standing, demonstrates bilateral knee flexion posturing when ambulating     Right knee: - Focal soft tissue swelling: none - Ecchymosis: none - Erythema: none - Effusion: moderate, No palpable Baker's cysts  - Wounds: well-healed anterior incision, benign appearing - Alignment: normal - Tenderness: none, mild crepitus but no tenderness with patellar compression test - ROM: 8-105, limited by pain and apprehension - Collateral laxity: none - Cruciate laxity: none - Popliteal angle (degrees): 40 - Quad strength: 4+/5, effort limited by pain [de-identified] : We reviewed right knee XRs taken at [facility] on 06/21/2024, which demonstrate: Right knee --  Alignment: Normal  Arthritis: 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, Kellgren & Gavino 2 - There is unchanged appearance of the parially threated screw fixation of her patella without evidence of breakage or mechanical complication Patellar height: Normal  Patellar tracking: Centrally - There is chondrocalcinosis present in the patellofemoral compartment ------------------------------------------------- We also reviewed a right knee CT taken at [facility] on 07/24/2024, which demonstrates: - Tricompartmental osteoarthritis and chondrocalcinosis s/p patella ORIF - There is  no evidence of any acute fracture - There was a moderate joint effusion present at that time

## 2024-08-12 NOTE — DISCUSSION/SUMMARY
[de-identified] : IMP: 79 year old female with right knee post traumatic osteoarthritis, associated flexion contracture, arthrofibrosis, and possible superimposed pseudogout  - We will continue with conservative treatment at this time, including PT, HEP, and Tylenol as needed. - I administered a right knee aspiration and CSI today.   - We will apply for right knee HA injection authorization to be administered whenever authorized. - If injections continue to give good relief, we can continue these serially for as long as they remain effective.   - We reviewed that she could potentially also consider right total knee arthroplasty if her symptoms progress from here, though this is something she would prefer to avoid secondary to previous negative experiences with anesthesia.

## 2024-08-12 NOTE — PHYSICAL EXAM
[de-identified] : General appearance: well nourished and hydrated, pleasant, alert and oriented x 3, cooperative. HEENT: normocephalic, EOM intact, wearing mask, external auditory canal clear. Cardiovascular: no lower leg edema, no varicosities, dorsalis pedis pulses palpable and symmetric. Lymphatics: no palpable lymphadenopathy, no lymphedema. Neurologic: sensation is normal, no muscle weakness in upper or lower extremities, patella tendon reflexes present and symmetric. Dermatologic: skin moist, warm, no rash. Spine: cervical spine with normal lordosis and painless range of motion, thoracic spine with painless range of motion, lumbosacral spine with normal lordosis and painless range of motion.  No tenderness to palpation along midline spine and paraspinal musculature.  Sacroiliac joints nontender bilaterally. Negative SLR and crossed SLR tests bilaterally. Exaggerated thoracic kyphosis and has overall positive sagittal balance Gait: Transitions easily from sitting to standing, demonstrates bilateral knee flexion posturing when ambulating     Right knee: - Focal soft tissue swelling: none - Ecchymosis: none - Erythema: none - Effusion: moderate, No palpable Baker's cysts  - Wounds: well-healed anterior incision, benign appearing - Alignment: normal - Tenderness: none, mild crepitus but no tenderness with patellar compression test - ROM: 8-105, limited by pain and apprehension - Collateral laxity: none - Cruciate laxity: none - Popliteal angle (degrees): 40 - Quad strength: 4+/5, effort limited by pain [de-identified] : We reviewed right knee XRs taken at [facility] on 06/21/2024, which demonstrate: Right knee --  Alignment: Normal  Arthritis: 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, Kellgren & Gavino 2 - There is unchanged appearance of the parially threated screw fixation of her patella without evidence of breakage or mechanical complication Patellar height: Normal  Patellar tracking: Centrally - There is chondrocalcinosis present in the patellofemoral compartment ------------------------------------------------- We also reviewed a right knee CT taken at [facility] on 07/24/2024, which demonstrates: - Tricompartmental osteoarthritis and chondrocalcinosis s/p patella ORIF - There is  no evidence of any acute fracture - There was a moderate joint effusion present at that time  Holding RN to OR RN Unit RN to OR RN

## 2024-08-12 NOTE — END OF VISIT
[FreeTextEntry3] : All medical record entries made by the Scribe were at my, Dr. Abhishek Lynn, direction and personally dictated by me on 08/09/2024. I have reviewed the chart and agree that the record accurately reflects my personal performance of the history, physical exam, assessment and plan. I have also personally directed, reviewed, and agreed with the chart. 
[FreeTextEntry3] : All medical record entries made by the Scribe were at my, Dr. Abhishek Lynn, direction and personally dictated by me on 08/09/2024. I have reviewed the chart and agree that the record accurately reflects my personal performance of the history, physical exam, assessment and plan. I have also personally directed, reviewed, and agreed with the chart. 
26.6

## 2024-08-12 NOTE — PROCEDURE
[de-identified] : Procedure: Knee joint aspiration and injection  Laterality: Right  Indication: diagnostic - discussed with patient Skin prep: alcohol and chlorhexidine Anesthesia: ethyl chloride spray Needle: 18-gauge Portal: superolateral Aspiration: 17 cc of slightly blood-tinged synovial fluid  Injectate:  2cc of 2% lidocaine, 2cc of 0.5% bupivacaine, and 1cc of 40mg/mL triamcinolone  Dressing: Band-aid Complications: None

## 2024-08-12 NOTE — HISTORY OF PRESENT ILLNESS
[de-identified] : 08/09/2024: 79 year old female previously seen here for right knee pain associated with associated prior ORIF of patella fracture, osteoarthritis, and possible pseudogout. She was last seen here about 2 years ago. She reports one more episode of right knee pain and swelling in the late winter/early spring of this year, which she treated with relative rest. She has been following with Dr. Meng, who performed a right knee aspiration 3 days ago that demonstrated no evidence of active inflammation or infection. She was taking ibuprofen with great relief, but she discontinued it secondary to her history of CKD and HTN. She has been taking Tylenol with some relief in limited doses. She is ambulating without the use of an assistive device. She complains more of stiffness than pain and a constant awareness of the knee that was not present before.   10/21/22: 77 y/o female presents for right knee pain for about 1 month. Patient states she got up from the couch walked to the bathroom and felt her right knee give out. She denies any fall or precipitating events. She went to the ER and was provided ACE compression. She admits being opposed to pain medication and has not used anything for pain relief. Symptoms have spontaneously recovered since then. Describes her pain as a persisting dull sensations and rates it 0/10.   PSH: Right patella fracture repair 25 years ago. She reports no residual pain following surgery.

## 2024-08-12 NOTE — DISCUSSION/SUMMARY
[de-identified] : IMP: 79 year old female with right knee post traumatic osteoarthritis, associated flexion contracture, arthrofibrosis, and possible superimposed pseudogout  - We will continue with conservative treatment at this time, including PT, HEP, and Tylenol as needed. - I administered a right knee aspiration and CSI today.   - We will apply for right knee HA injection authorization to be administered whenever authorized. - If injections continue to give good relief, we can continue these serially for as long as they remain effective.   - We reviewed that she could potentially also consider right total knee arthroplasty if her symptoms progress from here, though this is something she would prefer to avoid secondary to previous negative experiences with anesthesia.

## 2024-08-12 NOTE — PROCEDURE
[de-identified] : Procedure: Knee joint aspiration and injection  Laterality: Right  Indication: diagnostic - discussed with patient Skin prep: alcohol and chlorhexidine Anesthesia: ethyl chloride spray Needle: 18-gauge Portal: superolateral Aspiration: 17 cc of slightly blood-tinged synovial fluid  Injectate:  2cc of 2% lidocaine, 2cc of 0.5% bupivacaine, and 1cc of 40mg/mL triamcinolone  Dressing: Band-aid Complications: None

## 2024-08-13 ENCOUNTER — APPOINTMENT (OUTPATIENT)
Dept: UROLOGY | Facility: CLINIC | Age: 79
End: 2024-08-13

## 2024-08-29 ENCOUNTER — APPOINTMENT (OUTPATIENT)
Dept: INTERNAL MEDICINE | Facility: CLINIC | Age: 79
End: 2024-08-29
Payer: MEDICARE

## 2024-08-29 VITALS
DIASTOLIC BLOOD PRESSURE: 72 MMHG | TEMPERATURE: 98.2 F | HEIGHT: 63 IN | WEIGHT: 141 LBS | OXYGEN SATURATION: 99 % | HEART RATE: 59 BPM | SYSTOLIC BLOOD PRESSURE: 163 MMHG | BODY MASS INDEX: 24.98 KG/M2

## 2024-08-29 VITALS — SYSTOLIC BLOOD PRESSURE: 130 MMHG | DIASTOLIC BLOOD PRESSURE: 80 MMHG

## 2024-08-29 DIAGNOSIS — E78.5 HYPERLIPIDEMIA, UNSPECIFIED: ICD-10-CM

## 2024-08-29 DIAGNOSIS — I10 ESSENTIAL (PRIMARY) HYPERTENSION: ICD-10-CM

## 2024-08-29 DIAGNOSIS — R35.0 FREQUENCY OF MICTURITION: ICD-10-CM

## 2024-08-29 PROCEDURE — 99213 OFFICE O/P EST LOW 20 MIN: CPT

## 2024-09-04 ENCOUNTER — APPOINTMENT (OUTPATIENT)
Dept: ORTHOPEDIC SURGERY | Facility: CLINIC | Age: 79
End: 2024-09-04

## 2024-09-04 ENCOUNTER — APPOINTMENT (OUTPATIENT)
Dept: ENDOCRINOLOGY | Facility: CLINIC | Age: 79
End: 2024-09-04

## 2024-09-04 VITALS
WEIGHT: 141 LBS | TEMPERATURE: 98.6 F | OXYGEN SATURATION: 99 % | BODY MASS INDEX: 24.98 KG/M2 | DIASTOLIC BLOOD PRESSURE: 84 MMHG | SYSTOLIC BLOOD PRESSURE: 157 MMHG | HEART RATE: 59 BPM | HEIGHT: 63 IN

## 2024-09-04 DIAGNOSIS — M17.11 UNILATERAL PRIMARY OSTEOARTHRITIS, RIGHT KNEE: ICD-10-CM

## 2024-09-04 PROCEDURE — 20610 DRAIN/INJ JOINT/BURSA W/O US: CPT | Mod: RT

## 2024-09-11 NOTE — PROCEDURE
[de-identified] :  Durolane injection - Right knee joint  LOT# [id number] EXP: 2027-01-31 MAN: Augusto NDC: [id number]   Procedure: Knee joint injection Laterality:  RIGHT Indication: Osteoarthritis - discussed with patient Skin prep: alcohol and chlorhexidine Anesthesia: ethyl chloride spray Needle: 20-gauge Portal: inferolateral Aspiration: none Injectate: DUROLANE Dressing: Band-aid Complications: None  - Patient reports good relief from CSI thus far. F/U as needed

## 2024-09-11 NOTE — PROCEDURE
[de-identified] :  Durolane injection - Right knee joint  LOT# [id number] EXP: 2027-01-31 MAN: Augusto NDC: [id number]   Procedure: Knee joint injection Laterality:  RIGHT Indication: Osteoarthritis - discussed with patient Skin prep: alcohol and chlorhexidine Anesthesia: ethyl chloride spray Needle: 20-gauge Portal: inferolateral Aspiration: none Injectate: DUROLANE Dressing: Band-aid Complications: None  - Patient reports good relief from CSI thus far. F/U as needed

## 2024-09-16 ENCOUNTER — APPOINTMENT (OUTPATIENT)
Dept: PHYSICAL MEDICINE AND REHAB | Facility: CLINIC | Age: 79
End: 2024-09-16

## 2024-09-23 ENCOUNTER — NON-APPOINTMENT (OUTPATIENT)
Age: 79
End: 2024-09-23

## 2024-09-25 ENCOUNTER — APPOINTMENT (OUTPATIENT)
Dept: VASCULAR SURGERY | Facility: CLINIC | Age: 79
End: 2024-09-25
Payer: MEDICARE

## 2024-09-25 PROCEDURE — 93971 EXTREMITY STUDY: CPT | Mod: RT

## 2024-09-26 ENCOUNTER — NON-APPOINTMENT (OUTPATIENT)
Age: 79
End: 2024-09-26

## 2024-10-04 ENCOUNTER — APPOINTMENT (OUTPATIENT)
Dept: PHYSICAL MEDICINE AND REHAB | Facility: CLINIC | Age: 79
End: 2024-10-04

## 2024-10-07 ENCOUNTER — NON-APPOINTMENT (OUTPATIENT)
Age: 79
End: 2024-10-07

## 2024-10-14 ENCOUNTER — APPOINTMENT (OUTPATIENT)
Dept: HEART AND VASCULAR | Facility: CLINIC | Age: 79
End: 2024-10-14
Payer: MEDICARE

## 2024-10-14 VITALS
HEART RATE: 58 BPM | DIASTOLIC BLOOD PRESSURE: 73 MMHG | SYSTOLIC BLOOD PRESSURE: 164 MMHG | HEIGHT: 63 IN | OXYGEN SATURATION: 98 %

## 2024-10-14 VITALS — DIASTOLIC BLOOD PRESSURE: 82 MMHG | SYSTOLIC BLOOD PRESSURE: 153 MMHG

## 2024-10-14 PROCEDURE — G2211 COMPLEX E/M VISIT ADD ON: CPT

## 2024-10-14 PROCEDURE — 99214 OFFICE O/P EST MOD 30 MIN: CPT

## 2024-10-14 RX ORDER — LOSARTAN POTASSIUM AND HYDROCHLOROTHIAZIDE 12.5; 1 MG/1; MG/1
100-12.5 TABLET ORAL DAILY
Qty: 60 | Refills: 1 | Status: ACTIVE | COMMUNITY
Start: 2024-10-14 | End: 1900-01-01

## 2024-10-15 LAB
ALBUMIN SERPL ELPH-MCNC: 4.4 G/DL
ALP BLD-CCNC: 61 U/L
ALT SERPL-CCNC: 20 U/L
ANION GAP SERPL CALC-SCNC: 13 MMOL/L
AST SERPL-CCNC: 23 U/L
BILIRUB SERPL-MCNC: 0.3 MG/DL
BUN SERPL-MCNC: 20 MG/DL
CALCIUM SERPL-MCNC: 8.5 MG/DL
CHLORIDE SERPL-SCNC: 106 MMOL/L
CO2 SERPL-SCNC: 24 MMOL/L
CREAT SERPL-MCNC: 1.24 MG/DL
EGFR: 45 ML/MIN/1.73M2
GLUCOSE SERPL-MCNC: 118 MG/DL
POTASSIUM SERPL-SCNC: 4.8 MMOL/L
PROT SERPL-MCNC: 6.2 G/DL
SODIUM SERPL-SCNC: 144 MMOL/L

## 2024-10-17 ENCOUNTER — APPOINTMENT (OUTPATIENT)
Dept: ENDOCRINOLOGY | Facility: CLINIC | Age: 79
End: 2024-10-17
Payer: MEDICARE

## 2024-10-17 VITALS
HEART RATE: 57 BPM | SYSTOLIC BLOOD PRESSURE: 155 MMHG | DIASTOLIC BLOOD PRESSURE: 88 MMHG | WEIGHT: 137 LBS | BODY MASS INDEX: 24.27 KG/M2

## 2024-10-17 DIAGNOSIS — M81.0 AGE-RELATED OSTEOPOROSIS W/OUT CURRENT PATHOLOGICAL FRACTURE: ICD-10-CM

## 2024-10-17 PROCEDURE — 99204 OFFICE O/P NEW MOD 45 MIN: CPT | Mod: 25

## 2024-10-17 PROCEDURE — 36415 COLL VENOUS BLD VENIPUNCTURE: CPT

## 2024-10-18 LAB
25(OH)D3 SERPL-MCNC: 23.7 NG/ML
ALBUMIN SERPL ELPH-MCNC: 4.3 G/DL
ALP BLD-CCNC: 62 U/L
ALT SERPL-CCNC: 17 U/L
ANION GAP SERPL CALC-SCNC: 14 MMOL/L
AST SERPL-CCNC: 22 U/L
BILIRUB SERPL-MCNC: 0.4 MG/DL
BUN SERPL-MCNC: 23 MG/DL
CALCIUM SERPL-MCNC: 9.9 MG/DL
CALCIUM SERPL-MCNC: 9.9 MG/DL
CHLORIDE SERPL-SCNC: 103 MMOL/L
CO2 SERPL-SCNC: 26 MMOL/L
CREAT SERPL-MCNC: 1.42 MG/DL
EGFR: 38 ML/MIN/1.73M2
GLUCOSE SERPL-MCNC: 82 MG/DL
MAGNESIUM SERPL-MCNC: 2.3 MG/DL
PARATHYROID HORMONE INTACT: 77 PG/ML
PHOSPHATE SERPL-MCNC: 4.1 MG/DL
POTASSIUM SERPL-SCNC: 4.4 MMOL/L
PROT SERPL-MCNC: 6.6 G/DL
SODIUM SERPL-SCNC: 143 MMOL/L
T4 FREE SERPL-MCNC: 1.3 NG/DL
TSH SERPL-ACNC: 2.12 UIU/ML

## 2024-10-22 LAB — ALP BONE SERPL-MCNC: 12 UG/L

## 2024-10-24 ENCOUNTER — OUTPATIENT (OUTPATIENT)
Dept: OUTPATIENT SERVICES | Facility: HOSPITAL | Age: 79
LOS: 1 days | End: 2024-10-24
Payer: MEDICARE

## 2024-10-24 ENCOUNTER — APPOINTMENT (OUTPATIENT)
Dept: MRI IMAGING | Facility: HOSPITAL | Age: 79
End: 2024-10-24

## 2024-10-24 PROCEDURE — 73718 MRI LOWER EXTREMITY W/O DYE: CPT

## 2024-10-24 PROCEDURE — 73718 MRI LOWER EXTREMITY W/O DYE: CPT | Mod: 26,RT

## 2024-10-25 ENCOUNTER — APPOINTMENT (OUTPATIENT)
Dept: NEPHROLOGY | Facility: CLINIC | Age: 79
End: 2024-10-25
Payer: MEDICARE

## 2024-10-25 ENCOUNTER — LABORATORY RESULT (OUTPATIENT)
Age: 79
End: 2024-10-25

## 2024-10-25 VITALS — SYSTOLIC BLOOD PRESSURE: 153 MMHG | DIASTOLIC BLOOD PRESSURE: 82 MMHG

## 2024-10-25 DIAGNOSIS — I10 ESSENTIAL (PRIMARY) HYPERTENSION: ICD-10-CM

## 2024-10-25 DIAGNOSIS — N18.9 CHRONIC KIDNEY DISEASE, UNSPECIFIED: ICD-10-CM

## 2024-10-25 PROCEDURE — 99204 OFFICE O/P NEW MOD 45 MIN: CPT

## 2024-10-25 PROCEDURE — G2211 COMPLEX E/M VISIT ADD ON: CPT

## 2024-10-29 LAB
APPEARANCE: CLEAR
BILIRUBIN URINE: NEGATIVE
BLOOD URINE: NEGATIVE
COLOR: YELLOW
CREAT SPEC-SCNC: 108 MG/DL
CREAT SPEC-SCNC: 108 MG/DL
CREAT/PROT UR: 0.1 RATIO
GLUCOSE QUALITATIVE U: NEGATIVE MG/DL
KETONES URINE: ABNORMAL MG/DL
LEUKOCYTE ESTERASE URINE: ABNORMAL
MICROALBUMIN 24H UR DL<=1MG/L-MCNC: 1.8 MG/DL
MICROALBUMIN/CREAT 24H UR-RTO: 16 MG/G
NITRITE URINE: NEGATIVE
PH URINE: 7
PROT UR-MCNC: 11 MG/DL
PROTEIN URINE: NORMAL MG/DL
SPECIFIC GRAVITY URINE: 1.02
UROBILINOGEN URINE: 0.2 MG/DL

## 2024-10-30 ENCOUNTER — NON-APPOINTMENT (OUTPATIENT)
Age: 79
End: 2024-10-30

## 2024-10-30 DIAGNOSIS — D49.2 NEOPLASM OF UNSPECIFIED BEHAVIOR OF BONE, SOFT TISSUE, AND SKIN: ICD-10-CM

## 2024-11-07 ENCOUNTER — NON-APPOINTMENT (OUTPATIENT)
Age: 79
End: 2024-11-07

## 2024-11-11 ENCOUNTER — APPOINTMENT (OUTPATIENT)
Dept: HEART AND VASCULAR | Facility: CLINIC | Age: 79
End: 2024-11-11
Payer: MEDICARE

## 2024-11-11 VITALS
SYSTOLIC BLOOD PRESSURE: 148 MMHG | OXYGEN SATURATION: 97 % | WEIGHT: 143 LBS | BODY MASS INDEX: 25.34 KG/M2 | HEIGHT: 63 IN | HEART RATE: 59 BPM | DIASTOLIC BLOOD PRESSURE: 77 MMHG

## 2024-11-11 PROCEDURE — 99214 OFFICE O/P EST MOD 30 MIN: CPT

## 2024-11-19 ENCOUNTER — APPOINTMENT (OUTPATIENT)
Dept: MRI IMAGING | Facility: HOSPITAL | Age: 79
End: 2024-11-19

## 2024-11-19 ENCOUNTER — OUTPATIENT (OUTPATIENT)
Dept: OUTPATIENT SERVICES | Facility: HOSPITAL | Age: 79
LOS: 1 days | End: 2024-11-19
Payer: MEDICARE

## 2024-11-19 PROCEDURE — 73719 MRI LOWER EXTREMITY W/DYE: CPT

## 2024-11-19 PROCEDURE — 73719 MRI LOWER EXTREMITY W/DYE: CPT | Mod: 26,RT

## 2024-11-19 PROCEDURE — A9577: CPT

## 2024-11-27 ENCOUNTER — APPOINTMENT (OUTPATIENT)
Dept: ORTHOPEDIC SURGERY | Facility: CLINIC | Age: 79
End: 2024-11-27

## 2024-11-27 ENCOUNTER — NON-APPOINTMENT (OUTPATIENT)
Age: 79
End: 2024-11-27

## 2024-12-02 ENCOUNTER — APPOINTMENT (OUTPATIENT)
Dept: INTERNAL MEDICINE | Facility: CLINIC | Age: 79
End: 2024-12-02
Payer: MEDICARE

## 2024-12-02 VITALS
DIASTOLIC BLOOD PRESSURE: 74 MMHG | HEART RATE: 60 BPM | SYSTOLIC BLOOD PRESSURE: 161 MMHG | TEMPERATURE: 97.8 F | BODY MASS INDEX: 25.16 KG/M2 | WEIGHT: 142 LBS | HEIGHT: 63 IN | OXYGEN SATURATION: 98 %

## 2024-12-02 VITALS — DIASTOLIC BLOOD PRESSURE: 80 MMHG | SYSTOLIC BLOOD PRESSURE: 130 MMHG

## 2024-12-02 DIAGNOSIS — N18.9 CHRONIC KIDNEY DISEASE, UNSPECIFIED: ICD-10-CM

## 2024-12-02 DIAGNOSIS — M81.0 AGE-RELATED OSTEOPOROSIS W/OUT CURRENT PATHOLOGICAL FRACTURE: ICD-10-CM

## 2024-12-02 DIAGNOSIS — I10 ESSENTIAL (PRIMARY) HYPERTENSION: ICD-10-CM

## 2024-12-02 DIAGNOSIS — M79.2 NEURALGIA AND NEURITIS, UNSPECIFIED: ICD-10-CM

## 2024-12-02 DIAGNOSIS — M79.604 PAIN IN RIGHT LEG: ICD-10-CM

## 2024-12-02 PROCEDURE — 99213 OFFICE O/P EST LOW 20 MIN: CPT

## 2024-12-03 ENCOUNTER — OUTPATIENT (OUTPATIENT)
Dept: OUTPATIENT SERVICES | Facility: HOSPITAL | Age: 79
LOS: 1 days | End: 2024-12-03

## 2024-12-03 ENCOUNTER — APPOINTMENT (OUTPATIENT)
Dept: RADIOLOGY | Facility: HOSPITAL | Age: 79
End: 2024-12-03
Payer: MEDICARE

## 2024-12-03 PROCEDURE — 77085 DXA BONE DENSITY AXL VRT FX: CPT | Mod: 26

## 2024-12-03 PROCEDURE — 77085 DXA BONE DENSITY AXL VRT FX: CPT

## 2024-12-09 ENCOUNTER — APPOINTMENT (OUTPATIENT)
Dept: HEART AND VASCULAR | Facility: CLINIC | Age: 79
End: 2024-12-09
Payer: MEDICARE

## 2024-12-09 PROCEDURE — G2211 COMPLEX E/M VISIT ADD ON: CPT

## 2024-12-09 PROCEDURE — 99204 OFFICE O/P NEW MOD 45 MIN: CPT

## 2024-12-26 ENCOUNTER — RX RENEWAL (OUTPATIENT)
Age: 79
End: 2024-12-26

## 2025-01-01 ENCOUNTER — NON-APPOINTMENT (OUTPATIENT)
Age: 80
End: 2025-01-01

## 2025-01-02 ENCOUNTER — APPOINTMENT (OUTPATIENT)
Dept: INTERNAL MEDICINE | Facility: CLINIC | Age: 80
End: 2025-01-02
Payer: MEDICARE

## 2025-01-02 VITALS — DIASTOLIC BLOOD PRESSURE: 70 MMHG | SYSTOLIC BLOOD PRESSURE: 120 MMHG

## 2025-01-02 DIAGNOSIS — I10 ESSENTIAL (PRIMARY) HYPERTENSION: ICD-10-CM

## 2025-01-02 DIAGNOSIS — N18.9 CHRONIC KIDNEY DISEASE, UNSPECIFIED: ICD-10-CM

## 2025-01-02 PROCEDURE — 99213 OFFICE O/P EST LOW 20 MIN: CPT

## 2025-01-21 ENCOUNTER — APPOINTMENT (OUTPATIENT)
Dept: ENDOCRINOLOGY | Facility: CLINIC | Age: 80
End: 2025-01-21

## 2025-02-26 ENCOUNTER — APPOINTMENT (OUTPATIENT)
Dept: INFUSION THERAPY | Facility: CLINIC | Age: 80
End: 2025-02-26

## 2025-02-26 ENCOUNTER — OUTPATIENT (OUTPATIENT)
Dept: OUTPATIENT SERVICES | Facility: HOSPITAL | Age: 80
LOS: 1 days | End: 2025-02-26
Payer: MEDICARE

## 2025-02-26 VITALS
TEMPERATURE: 98 F | SYSTOLIC BLOOD PRESSURE: 122 MMHG | RESPIRATION RATE: 17 BRPM | DIASTOLIC BLOOD PRESSURE: 64 MMHG | OXYGEN SATURATION: 97 % | HEART RATE: 60 BPM | WEIGHT: 141.1 LBS | HEIGHT: 62 IN

## 2025-02-26 DIAGNOSIS — M79.2 NEURALGIA AND NEURITIS, UNSPECIFIED: ICD-10-CM

## 2025-02-26 PROCEDURE — 96372 THER/PROPH/DIAG INJ SC/IM: CPT

## 2025-02-26 RX ORDER — DENOSUMAB 60 MG/ML
60 INJECTION SUBCUTANEOUS ONCE
Refills: 0 | Status: COMPLETED | OUTPATIENT
Start: 2025-02-26 | End: 2025-02-26

## 2025-02-26 RX ADMIN — DENOSUMAB 60 MILLIGRAM(S): 60 INJECTION SUBCUTANEOUS at 14:56

## 2025-02-27 ENCOUNTER — NON-APPOINTMENT (OUTPATIENT)
Age: 80
End: 2025-02-27

## 2025-03-07 ENCOUNTER — NON-APPOINTMENT (OUTPATIENT)
Age: 80
End: 2025-03-07

## 2025-03-12 ENCOUNTER — OUTPATIENT (OUTPATIENT)
Dept: OUTPATIENT SERVICES | Facility: HOSPITAL | Age: 80
LOS: 1 days | End: 2025-03-12
Payer: MEDICARE

## 2025-03-12 ENCOUNTER — RESULT REVIEW (OUTPATIENT)
Age: 80
End: 2025-03-12

## 2025-03-12 ENCOUNTER — APPOINTMENT (OUTPATIENT)
Dept: ORTHOPEDIC SURGERY | Facility: CLINIC | Age: 80
End: 2025-03-12
Payer: MEDICARE

## 2025-03-12 VITALS
SYSTOLIC BLOOD PRESSURE: 116 MMHG | BODY MASS INDEX: 26.13 KG/M2 | DIASTOLIC BLOOD PRESSURE: 70 MMHG | WEIGHT: 142 LBS | HEART RATE: 58 BPM | OXYGEN SATURATION: 100 % | HEIGHT: 62 IN

## 2025-03-12 DIAGNOSIS — M17.11 UNILATERAL PRIMARY OSTEOARTHRITIS, RIGHT KNEE: ICD-10-CM

## 2025-03-12 PROCEDURE — 73564 X-RAY EXAM KNEE 4 OR MORE: CPT | Mod: 26,50

## 2025-03-12 PROCEDURE — 20610 DRAIN/INJ JOINT/BURSA W/O US: CPT | Mod: RT

## 2025-03-12 PROCEDURE — 73564 X-RAY EXAM KNEE 4 OR MORE: CPT

## 2025-03-13 DIAGNOSIS — G25.81 RESTLESS LEGS SYNDROME: ICD-10-CM

## 2025-03-13 RX ORDER — ROPINIROLE 0.5 MG/1
0.5 TABLET, FILM COATED ORAL AT BEDTIME
Qty: 20 | Refills: 3 | Status: ACTIVE | COMMUNITY
Start: 2025-03-13 | End: 1900-01-01

## 2025-03-18 NOTE — H&P ADULT - ASSESSMENT
77 yo with History of hypertension, hyperlipidemia, renal insufficiency, patient presents with 6 day hx of cough, congestion, nausea decreased P.O intake occasional diarrhea and a fall preceded by lightheadedness on the day of admission. She was found to have bradycardia and KATIE, admitted for further work up of syncope and treatment of KATIE  77 yo with History of hypertension, hyperlipidemia, osteoporosis, ? renal insufficiency, patient presents with 6 day hx of cough, congestion, nausea decreased P.O intake occasional diarrhea and a fall preceded by lightheadedness on the day of admission. She was found to have bradycardia and KATIE, admitted for further work up of presyncope and treatment of KATIE  26

## 2025-04-08 ENCOUNTER — APPOINTMENT (OUTPATIENT)
Dept: INTERNAL MEDICINE | Facility: CLINIC | Age: 80
End: 2025-04-08

## 2025-04-10 ENCOUNTER — APPOINTMENT (OUTPATIENT)
Dept: NEPHROLOGY | Facility: CLINIC | Age: 80
End: 2025-04-10

## 2025-04-29 ENCOUNTER — APPOINTMENT (OUTPATIENT)
Dept: NEPHROLOGY | Facility: CLINIC | Age: 80
End: 2025-04-29
Payer: MEDICARE

## 2025-04-29 VITALS — SYSTOLIC BLOOD PRESSURE: 134 MMHG | DIASTOLIC BLOOD PRESSURE: 71 MMHG

## 2025-04-29 DIAGNOSIS — N18.9 CHRONIC KIDNEY DISEASE, UNSPECIFIED: ICD-10-CM

## 2025-04-29 DIAGNOSIS — I10 ESSENTIAL (PRIMARY) HYPERTENSION: ICD-10-CM

## 2025-04-29 PROCEDURE — 99213 OFFICE O/P EST LOW 20 MIN: CPT

## 2025-05-28 ENCOUNTER — APPOINTMENT (OUTPATIENT)
Facility: CLINIC | Age: 80
End: 2025-05-28

## 2025-06-11 ENCOUNTER — APPOINTMENT (OUTPATIENT)
Dept: INTERNAL MEDICINE | Facility: CLINIC | Age: 80
End: 2025-06-11
Payer: MEDICARE

## 2025-06-11 VITALS
BODY MASS INDEX: 25.03 KG/M2 | DIASTOLIC BLOOD PRESSURE: 55 MMHG | WEIGHT: 136 LBS | SYSTOLIC BLOOD PRESSURE: 102 MMHG | TEMPERATURE: 97.6 F | OXYGEN SATURATION: 97 % | HEIGHT: 62 IN | HEART RATE: 59 BPM

## 2025-06-11 PROCEDURE — G0438: CPT

## 2025-06-11 RX ORDER — ROPINIROLE 1 MG/1
1 TABLET, FILM COATED ORAL AT BEDTIME
Qty: 30 | Refills: 0 | Status: ACTIVE | COMMUNITY
Start: 2025-06-11 | End: 1900-01-01

## 2025-06-24 ENCOUNTER — APPOINTMENT (OUTPATIENT)
Dept: HEART AND VASCULAR | Facility: CLINIC | Age: 80
End: 2025-06-24

## 2025-06-28 ENCOUNTER — RX RENEWAL (OUTPATIENT)
Age: 80
End: 2025-06-28

## 2025-06-30 ENCOUNTER — APPOINTMENT (OUTPATIENT)
Dept: HEART AND VASCULAR | Facility: CLINIC | Age: 80
End: 2025-06-30

## 2025-07-01 ENCOUNTER — APPOINTMENT (OUTPATIENT)
Dept: ORTHOPEDIC SURGERY | Facility: CLINIC | Age: 80
End: 2025-07-01

## 2025-07-03 ENCOUNTER — RX RENEWAL (OUTPATIENT)
Age: 80
End: 2025-07-03

## 2025-07-17 ENCOUNTER — APPOINTMENT (OUTPATIENT)
Dept: ORTHOPEDIC SURGERY | Facility: CLINIC | Age: 80
End: 2025-07-17

## 2025-07-17 PROBLEM — Z98.890: Status: ACTIVE | Noted: 2025-07-17

## 2025-07-17 PROCEDURE — 99214 OFFICE O/P EST MOD 30 MIN: CPT | Mod: 25

## 2025-07-17 PROCEDURE — 20611 DRAIN/INJ JOINT/BURSA W/US: CPT | Mod: RT

## 2025-07-21 ENCOUNTER — APPOINTMENT (OUTPATIENT)
Dept: INTERNAL MEDICINE | Facility: CLINIC | Age: 80
End: 2025-07-21
Payer: MEDICARE

## 2025-07-21 VITALS
TEMPERATURE: 97.3 F | OXYGEN SATURATION: 99 % | HEIGHT: 63 IN | SYSTOLIC BLOOD PRESSURE: 153 MMHG | BODY MASS INDEX: 25.16 KG/M2 | DIASTOLIC BLOOD PRESSURE: 84 MMHG | HEART RATE: 53 BPM | WEIGHT: 142 LBS

## 2025-07-21 DIAGNOSIS — E78.5 HYPERLIPIDEMIA, UNSPECIFIED: ICD-10-CM

## 2025-07-21 DIAGNOSIS — Z01.818 ENCOUNTER FOR OTHER PREPROCEDURAL EXAMINATION: ICD-10-CM

## 2025-07-21 DIAGNOSIS — I10 ESSENTIAL (PRIMARY) HYPERTENSION: ICD-10-CM

## 2025-07-21 DIAGNOSIS — N18.9 CHRONIC KIDNEY DISEASE, UNSPECIFIED: ICD-10-CM

## 2025-07-21 PROCEDURE — 36415 COLL VENOUS BLD VENIPUNCTURE: CPT

## 2025-07-21 PROCEDURE — 99213 OFFICE O/P EST LOW 20 MIN: CPT

## 2025-07-22 LAB
ALBUMIN SERPL ELPH-MCNC: 4.4 G/DL
ALP BLD-CCNC: 68 U/L
ALT SERPL-CCNC: 22 U/L
ANION GAP SERPL CALC-SCNC: 14 MMOL/L
APTT BLD: 25.7 SEC
AST SERPL-CCNC: 23 U/L
BILIRUB SERPL-MCNC: 0.5 MG/DL
BUN SERPL-MCNC: 37 MG/DL
CALCIUM SERPL-MCNC: 9.8 MG/DL
CHLORIDE SERPL-SCNC: 105 MMOL/L
CO2 SERPL-SCNC: 22 MMOL/L
CREAT SERPL-MCNC: 1.56 MG/DL
EGFRCR SERPLBLD CKD-EPI 2021: 34 ML/MIN/1.73M2
GLUCOSE SERPL-MCNC: 95 MG/DL
HCT VFR BLD CALC: 40.3 %
HGB BLD-MCNC: 12.7 G/DL
INR PPP: 0.86 RATIO
MCHC RBC-ENTMCNC: 30.9 PG
MCHC RBC-ENTMCNC: 31.5 G/DL
MCV RBC AUTO: 98.1 FL
PLATELET # BLD AUTO: 172 K/UL
POTASSIUM SERPL-SCNC: 4.1 MMOL/L
PROT SERPL-MCNC: 6.2 G/DL
PT BLD: 10.3 SEC
RBC # BLD: 4.11 M/UL
RBC # FLD: 14 %
SODIUM SERPL-SCNC: 141 MMOL/L
WBC # FLD AUTO: 8.79 K/UL

## 2025-07-23 LAB
APPEARANCE: CLEAR
BACTERIA: NEGATIVE /HPF
BILIRUBIN URINE: NEGATIVE
BLOOD URINE: NEGATIVE
CAST: 0 /LPF
COLOR: YELLOW
EPITHELIAL CELLS: 1 /HPF
GLUCOSE QUALITATIVE U: NEGATIVE MG/DL
KETONES URINE: NEGATIVE MG/DL
LEUKOCYTE ESTERASE URINE: ABNORMAL
MICROSCOPIC-UA: NORMAL
NITRITE URINE: NEGATIVE
PH URINE: 5.5
PROTEIN URINE: NEGATIVE MG/DL
RED BLOOD CELLS URINE: 0 /HPF
SPECIFIC GRAVITY URINE: 1.02
UROBILINOGEN URINE: 0.2 MG/DL
WHITE BLOOD CELLS URINE: 1 /HPF

## 2025-08-01 ENCOUNTER — APPOINTMENT (OUTPATIENT)
Dept: MAMMOGRAPHY | Facility: CLINIC | Age: 80
End: 2025-08-01
Payer: MEDICARE

## 2025-08-01 ENCOUNTER — RESULT REVIEW (OUTPATIENT)
Age: 80
End: 2025-08-01

## 2025-08-01 PROCEDURE — 77067 SCR MAMMO BI INCL CAD: CPT

## 2025-08-01 PROCEDURE — 77063 BREAST TOMOSYNTHESIS BI: CPT

## 2025-08-07 ENCOUNTER — RX RENEWAL (OUTPATIENT)
Age: 80
End: 2025-08-07

## 2025-08-19 ENCOUNTER — APPOINTMENT (OUTPATIENT)
Dept: OBGYN | Facility: CLINIC | Age: 80
End: 2025-08-19
Payer: MEDICARE

## 2025-08-19 VITALS
DIASTOLIC BLOOD PRESSURE: 72 MMHG | BODY MASS INDEX: 25.15 KG/M2 | HEART RATE: 64 BPM | WEIGHT: 142 LBS | OXYGEN SATURATION: 95 % | SYSTOLIC BLOOD PRESSURE: 136 MMHG

## 2025-08-19 DIAGNOSIS — Z01.419 ENCOUNTER FOR GYNECOLOGICAL EXAMINATION (GENERAL) (ROUTINE) W/OUT ABNORMAL FINDINGS: ICD-10-CM

## 2025-08-19 PROCEDURE — G0101: CPT

## 2025-08-25 LAB — CYTOLOGY CVX/VAG DOC THIN PREP: ABNORMAL

## 2025-08-26 ENCOUNTER — APPOINTMENT (OUTPATIENT)
Dept: RADIOLOGY | Facility: CLINIC | Age: 80
End: 2025-08-26
Payer: MEDICARE

## 2025-08-26 PROCEDURE — 77085 DXA BONE DENSITY AXL VRT FX: CPT

## 2025-08-28 ENCOUNTER — APPOINTMENT (OUTPATIENT)
Dept: INFUSION THERAPY | Facility: CLINIC | Age: 80
End: 2025-08-28

## 2025-08-28 ENCOUNTER — OUTPATIENT (OUTPATIENT)
Dept: OUTPATIENT SERVICES | Facility: HOSPITAL | Age: 80
LOS: 1 days | End: 2025-08-28
Payer: MEDICARE

## 2025-08-28 VITALS
HEART RATE: 62 BPM | SYSTOLIC BLOOD PRESSURE: 117 MMHG | TEMPERATURE: 98 F | RESPIRATION RATE: 16 BRPM | WEIGHT: 141.98 LBS | HEIGHT: 62 IN | DIASTOLIC BLOOD PRESSURE: 77 MMHG | OXYGEN SATURATION: 96 %

## 2025-08-28 DIAGNOSIS — M79.2 NEURALGIA AND NEURITIS, UNSPECIFIED: ICD-10-CM

## 2025-08-28 PROCEDURE — 96372 THER/PROPH/DIAG INJ SC/IM: CPT

## 2025-08-28 RX ORDER — DENOSUMAB 60 MG/ML
60 INJECTION SUBCUTANEOUS ONCE
Refills: 0 | Status: COMPLETED | OUTPATIENT
Start: 2025-08-28 | End: 2025-08-28

## 2025-08-28 RX ADMIN — DENOSUMAB 60 MILLIGRAM(S): 60 INJECTION SUBCUTANEOUS at 15:34

## 2025-09-03 ENCOUNTER — APPOINTMENT (OUTPATIENT)
Dept: ENDOCRINOLOGY | Facility: CLINIC | Age: 80
End: 2025-09-03

## 2025-09-10 ENCOUNTER — RX RENEWAL (OUTPATIENT)
Age: 80
End: 2025-09-10